# Patient Record
Sex: MALE | Race: WHITE | NOT HISPANIC OR LATINO | Employment: UNEMPLOYED | URBAN - NONMETROPOLITAN AREA
[De-identification: names, ages, dates, MRNs, and addresses within clinical notes are randomized per-mention and may not be internally consistent; named-entity substitution may affect disease eponyms.]

---

## 2017-11-15 ENCOUNTER — TRANSCRIBE ORDERS (OUTPATIENT)
Dept: LAB | Facility: CLINIC | Age: 47
End: 2017-11-15

## 2018-05-11 LAB
ALBUMIN SERPL BCP-MCNC: 4.5 G/DL (ref 3.5–5.7)
ALP SERPL-CCNC: 71 IU/L (ref 40–150)
ALT SERPL W P-5'-P-CCNC: 19 IU/L (ref 0–50)
AMMONIA PLAS-SCNC: 43 UMO/L (ref 25–90)
ANION GAP SERPL CALCULATED.3IONS-SCNC: 10.2 MM/L
AST SERPL W P-5'-P-CCNC: 18 U/L (ref 8–27)
BASOPHILS # BLD AUTO: 0 X3/UL (ref 0–0.3)
BASOPHILS # BLD AUTO: 0.4 % (ref 0–2)
BILIRUB SERPL-MCNC: 0.7 MG/DL (ref 0.3–1)
BUN SERPL-MCNC: 13 MG/DL (ref 7–25)
CALCIUM SERPL-MCNC: 9.4 MG/DL (ref 8.6–10.5)
CHLORIDE SERPL-SCNC: 106 MM/L (ref 98–107)
CO2 SERPL-SCNC: 30 MM/L (ref 21–31)
CREAT SERPL-MCNC: 1.03 MG/DL (ref 0.7–1.3)
DEPRECATED RDW RBC AUTO: 12.9 % (ref 11.5–14.5)
EGFR (HISTORICAL): > 60 GFR
EGFR AFRICAN AMERICAN (HISTORICAL): > 60 GFR
EOSINOPHIL # BLD AUTO: 0 X3/UL (ref 0–0.5)
EOSINOPHIL NFR BLD AUTO: 0.7 % (ref 0–5)
GLUCOSE (HISTORICAL): 95 MG/DL (ref 65–99)
HCT VFR BLD AUTO: 44.4 % (ref 42–52)
HGB BLD-MCNC: 15.2 G/DL (ref 14–18)
LYMPHOCYTES # BLD AUTO: 1.3 X3/UL (ref 1.2–4.2)
LYMPHOCYTES NFR BLD AUTO: 21.6 % (ref 20.5–51.1)
MCH RBC QN AUTO: 30.5 PG (ref 26–34)
MCHC RBC AUTO-ENTMCNC: 34.2 G/DL (ref 31–36)
MCV RBC AUTO: 88.9 FL (ref 81–99)
MONOCYTES # BLD AUTO: 0.4 X3/UL (ref 0–1)
MONOCYTES NFR BLD AUTO: 5.8 % (ref 1.7–12)
NEUTROPHILS # BLD AUTO: 4.4 X3/UL (ref 1.4–6.5)
NEUTS SEG NFR BLD AUTO: 71.5 % (ref 42.2–75.2)
OSMOLALITY, SERUM (HISTORICAL): 283 MOSM (ref 262–291)
PLATELET # BLD AUTO: 194 X3/UL (ref 130–400)
PMV BLD AUTO: 9 FL (ref 8.6–11.7)
POTASSIUM SERPL-SCNC: 4.2 MM/L (ref 3.5–5.5)
RBC # BLD AUTO: 4.99 X6/UL (ref 4.3–5.9)
SODIUM SERPL-SCNC: 142 MM/L (ref 134–143)
TOTAL PROTEIN (HISTORICAL): 7.1 G/DL (ref 6.4–8.9)
WBC # BLD AUTO: 6.1 X3/UL (ref 4.8–10.8)

## 2018-12-27 ENCOUNTER — HOSPITAL ENCOUNTER (EMERGENCY)
Facility: HOSPITAL | Age: 48
Discharge: ANOTHER HOSPITAL | End: 2018-12-27
Attending: EMERGENCY MEDICINE | Admitting: EMERGENCY MEDICINE
Payer: MEDICARE

## 2018-12-27 ENCOUNTER — APPOINTMENT (EMERGENCY)
Dept: CT IMAGING | Facility: HOSPITAL | Age: 48
End: 2018-12-27
Payer: MEDICARE

## 2018-12-27 VITALS
DIASTOLIC BLOOD PRESSURE: 72 MMHG | WEIGHT: 180 LBS | OXYGEN SATURATION: 92 % | BODY MASS INDEX: 30.73 KG/M2 | HEART RATE: 55 BPM | HEIGHT: 64 IN | SYSTOLIC BLOOD PRESSURE: 126 MMHG | RESPIRATION RATE: 17 BRPM | TEMPERATURE: 97.3 F

## 2018-12-27 DIAGNOSIS — R00.1 BRADYCARDIA: Primary | ICD-10-CM

## 2018-12-27 DIAGNOSIS — R56.9 SEIZURE (HCC): ICD-10-CM

## 2018-12-27 LAB
ALBUMIN SERPL BCP-MCNC: 4.4 G/DL (ref 3.5–5.7)
ALP SERPL-CCNC: 58 U/L (ref 40–150)
ALT SERPL W P-5'-P-CCNC: 13 U/L (ref 7–52)
ANION GAP SERPL CALCULATED.3IONS-SCNC: 12 MMOL/L (ref 4–13)
APTT PPP: 28 SECONDS (ref 26–38)
AST SERPL W P-5'-P-CCNC: 19 U/L (ref 13–39)
ATRIAL RATE: 35 BPM
BILIRUB SERPL-MCNC: 0.6 MG/DL (ref 0.2–1)
BUN SERPL-MCNC: 13 MG/DL (ref 7–25)
CALCIUM SERPL-MCNC: 9.4 MG/DL (ref 8.6–10.5)
CHLORIDE SERPL-SCNC: 102 MMOL/L (ref 98–107)
CO2 SERPL-SCNC: 25 MMOL/L (ref 21–31)
CREAT SERPL-MCNC: 1.09 MG/DL (ref 0.7–1.3)
ERYTHROCYTE [DISTWIDTH] IN BLOOD BY AUTOMATED COUNT: 12.9 % (ref 11.5–14.5)
GFR SERPL CREATININE-BSD FRML MDRD: 81 ML/MIN/1.73SQ M
GLUCOSE SERPL-MCNC: 179 MG/DL (ref 65–99)
HCT VFR BLD AUTO: 45.4 % (ref 36.5–49.3)
HGB BLD-MCNC: 15 G/DL (ref 14–18)
INR PPP: 1.09 (ref 0.9–1.5)
LYMPHOCYTES # BLD AUTO: 1.15 THOUSAND/UL (ref 0.6–4.47)
LYMPHOCYTES # BLD AUTO: 9 % (ref 20–51)
MAGNESIUM SERPL-MCNC: 1.9 MG/DL (ref 1.9–2.7)
MCH RBC QN AUTO: 30.2 PG (ref 26–34)
MCHC RBC AUTO-ENTMCNC: 33.1 G/DL (ref 31–37)
MCV RBC AUTO: 91 FL (ref 81–99)
MONOCYTES # BLD AUTO: 0.26 THOUSAND/UL (ref 0–1.22)
MONOCYTES NFR BLD AUTO: 2 % (ref 4–12)
NEUTS SEG # BLD: 11.39 THOUSAND/UL (ref 1.81–6.82)
NEUTS SEG NFR BLD AUTO: 89 % (ref 42–75)
NRBC BLD AUTO-RTO: 0 /100 WBCS
PLATELET # BLD AUTO: 207 THOUSANDS/UL (ref 149–390)
PLATELET BLD QL SMEAR: ADEQUATE
PMV BLD AUTO: 10.2 FL (ref 8.6–11.7)
POTASSIUM SERPL-SCNC: 3.4 MMOL/L (ref 3.5–5.5)
PR INTERVAL: 126 MS
PROT SERPL-MCNC: 7 G/DL (ref 6.4–8.9)
PROTHROMBIN TIME: 12.7 SECONDS (ref 10.2–13)
QRS AXIS: 133 DEGREES
QRSD INTERVAL: 82 MS
QT INTERVAL: 458 MS
QTC INTERVAL: 338 MS
RBC # BLD AUTO: 4.97 MILLION/UL (ref 4.3–5.9)
RBC MORPH BLD: NORMAL
SODIUM SERPL-SCNC: 139 MMOL/L (ref 134–143)
T WAVE AXIS: 153 DEGREES
TOTAL CELLS COUNTED SPEC: 100
TROPONIN I SERPL-MCNC: <0.03 NG/ML
TSH SERPL DL<=0.05 MIU/L-ACNC: 2.68 UIU/ML (ref 0.45–5.33)
VENTRICULAR RATE: 33 BPM
WBC # BLD AUTO: 12.8 THOUSAND/UL (ref 4.8–10.8)

## 2018-12-27 PROCEDURE — 83735 ASSAY OF MAGNESIUM: CPT | Performed by: EMERGENCY MEDICINE

## 2018-12-27 PROCEDURE — 70450 CT HEAD/BRAIN W/O DYE: CPT

## 2018-12-27 PROCEDURE — 96375 TX/PRO/DX INJ NEW DRUG ADDON: CPT

## 2018-12-27 PROCEDURE — 85610 PROTHROMBIN TIME: CPT | Performed by: EMERGENCY MEDICINE

## 2018-12-27 PROCEDURE — 93005 ELECTROCARDIOGRAM TRACING: CPT

## 2018-12-27 PROCEDURE — 85027 COMPLETE CBC AUTOMATED: CPT | Performed by: EMERGENCY MEDICINE

## 2018-12-27 PROCEDURE — 85007 BL SMEAR W/DIFF WBC COUNT: CPT | Performed by: EMERGENCY MEDICINE

## 2018-12-27 PROCEDURE — 84443 ASSAY THYROID STIM HORMONE: CPT | Performed by: EMERGENCY MEDICINE

## 2018-12-27 PROCEDURE — 85730 THROMBOPLASTIN TIME PARTIAL: CPT | Performed by: EMERGENCY MEDICINE

## 2018-12-27 PROCEDURE — 96361 HYDRATE IV INFUSION ADD-ON: CPT

## 2018-12-27 PROCEDURE — 96365 THER/PROPH/DIAG IV INF INIT: CPT

## 2018-12-27 PROCEDURE — 93010 ELECTROCARDIOGRAM REPORT: CPT | Performed by: INTERNAL MEDICINE

## 2018-12-27 PROCEDURE — 36415 COLL VENOUS BLD VENIPUNCTURE: CPT | Performed by: EMERGENCY MEDICINE

## 2018-12-27 PROCEDURE — 99285 EMERGENCY DEPT VISIT HI MDM: CPT

## 2018-12-27 PROCEDURE — 84484 ASSAY OF TROPONIN QUANT: CPT | Performed by: EMERGENCY MEDICINE

## 2018-12-27 PROCEDURE — 80053 COMPREHEN METABOLIC PANEL: CPT | Performed by: EMERGENCY MEDICINE

## 2018-12-27 RX ORDER — LORATADINE 10 MG/1
10 TABLET ORAL DAILY
COMMUNITY

## 2018-12-27 RX ORDER — LEVOCARNITINE 330 MG/1
330 TABLET ORAL 2 TIMES DAILY
COMMUNITY

## 2018-12-27 RX ORDER — LEVETIRACETAM 500 MG/1
500 TABLET ORAL EVERY 12 HOURS SCHEDULED
COMMUNITY
End: 2019-07-24 | Stop reason: DRUGHIGH

## 2018-12-27 RX ORDER — HYDROMORPHONE HCL/PF 1 MG/ML
0.5 SYRINGE (ML) INJECTION ONCE
Status: COMPLETED | OUTPATIENT
Start: 2018-12-27 | End: 2018-12-27

## 2018-12-27 RX ADMIN — SODIUM CHLORIDE 500 ML: 0.9 INJECTION, SOLUTION INTRAVENOUS at 15:30

## 2018-12-27 RX ADMIN — SODIUM CHLORIDE 1000 MG: 9 INJECTION, SOLUTION INTRAVENOUS at 14:13

## 2018-12-27 RX ADMIN — HYDROMORPHONE HYDROCHLORIDE 0.5 MG: 1 INJECTION, SOLUTION INTRAMUSCULAR; INTRAVENOUS; SUBCUTANEOUS at 15:30

## 2018-12-27 NOTE — ED NOTES
Report called to trauma neuro  [facility] [address], [address] [place] [address]  12/27/18 6482

## 2018-12-27 NOTE — EMTALA/ACUTE CARE TRANSFER
[address] [facility] [address]  [facility] [address] [address]  [phone]  Dept: [phone]      [address] TRANSFER CONSENT    NAME Evan Knott                                         1970                              MRN [id number]    I have been informed of my rights regarding examination, treatment, and transfer   by Dr Kade Drummond DO    Benefits: Specialized equipment and/or services available at the receiving facility (Include comment)________________________, Continuity of care    Risks: Potential for delay in receiving treatment, Loss of IV, Potential deterioration of medical condition      Consent for Transfer:  I acknowledge that my medical condition has been evaluated and explained to me by the emergency department physician or other qualified medical person and/or my attending physician, who has recommended that I be transferred to the service of  Accepting Physician: DR Sangita Escamilla at [address] [place] [address] Name, [facility] : [address]  The above potential benefits of such transfer, the potential risks associated with such transfer, and the probable risks of not being transferred have been explained to me, and I fully understand them  The doctor has explained that, in my case, the benefits of transfer outweigh the risks  I agree to be transferred  I authorize the performance of emergency medical procedures and treatments upon me in both transit and upon arrival at the receiving facility  Additionally, I authorize the release of any and all medical records to the receiving facility and request they be transported with me, if possible  I understand that the safest mode of transportation during a medical emergency is an ambulance and that the Hospital advocates the use of this mode of transport   Risks of traveling to the receiving facility by car, including absence of medical control, life sustaining equipment, such as oxygen, and medical personnel has been explained to me and I fully understand them  (DEB CORRECT BOX BELOW)  [  ]  I consent to the stated transfer and to be transported by ambulance/helicopter  [  ]  I consent to the stated transfer, but refuse transportation by ambulance and accept full responsibility for my transportation by car  I understand the risks of non-ambulance transfers and I exonerate the Hospital and its staff from any deterioration in my condition that results from this refusal     X___________________________________________    DATE  18  TIME________  Signature of patient or legally responsible individual signing on patient behalf           RELATIONSHIP TO PATIENT_________________________          Provider Certification    NAME Evan Knott                                         1970                              MRN [id number]    A medical screening exam was performed on the above named patient  Based on the examination:    Condition Necessitating Transfer The primary encounter diagnosis was Bradycardia  A diagnosis of Seizure [facility]) was also pertinent to this visit      Patient Condition: The patient has been stabilized such that within reasonable medical probability, no material deterioration of the patient condition or the condition of the unborn child(nohelia) is likely to result from the transfer    Reason for Transfer: Level of Care needed not available at this facility    Transfer Requirements: Facility [address] [place]   · Space available and qualified personnel available for treatment as acknowledged by    · Agreed to accept transfer and to provide appropriate medical treatment as acknowledged by       DR MAYORGA  · Appropriate medical records of the examination and treatment of the patient are provided at the time of transfer   [address] _______  · Transfer will be performed by qualified personnel from    and appropriate transfer equipment as required, including the use of necessary and appropriate life support measures  Provider Certification: I have examined the patient and explained the following risks and benefits of being transferred/refusing transfer to the patient/family:  General risk, such as traffic hazards, adverse weather conditions, rough terrain or turbulence, possible failure of equipment (including vehicle or aircraft), or consequences of actions of persons outside the control of the transport personnel, Risk of worsening condition      Based on these reasonable risks and benefits to the patient and/or the unborn child(nohelia), and based upon the information available at the time of the patients examination, I certify that the medical benefits reasonably to be expected from the provision of appropriate medical treatments at another medical facility outweigh the increasing risks, if any, to the individuals medical condition, and in the case of labor to the unborn child, from effecting the transfer      X____________________________________________ DATE 12/27/18        TIME_______      ORIGINAL - SEND TO MEDICAL RECORDS   COPY - SEND WITH PATIENT DURING TRANSFER

## 2018-12-27 NOTE — ED PROVIDER NOTES
History  Chief Complaint   Patient presents with    Seizure - Prior Hx Of     patient arrives today post seizure with bradycardia and headache    Headache     PT ARRIVES VIA [facility] [place]  OCCURRED IN BATHROOM , AFTER STANDING FROM COMMODE PER HIS DAD, WITH WHOM HE LIVES  DAD DESCRIBES AS A STARING THEN EYES ROLLED BACK IN HEAD THEN DAD NOTICED HIS FISTS WERE CLENCHED; NO REPORT OF DIFFUSE, GRAND MAL ACTIVITY OR BITING OF TONGUE  PT HIMSELF DOES NOT GIVE A THOROUGH HISTORY, OTHER THAN SAYING HE HAS A HA, FRONTAL  NO F/C  NO RECENT ILLNESS  DAD REPORTS LAST SEIZURE WAS MANY YEARS AGO  HE DENIUES CP OR DIZZINES OR SOB, BUT ADMITS TO FATIGUE  AGAIN, OVERALL NOT A FORTHCOMING HISTORIAN AS HE MAY BE "POSR-ICTAL" TO SOME DEGREE  Prior to Admission Medications   Prescriptions Last Dose Informant Patient Reported? Taking?   levETIRAcetam (KEPPRA) 500 mg tablet   Yes Yes   Sig: Take 500 mg by mouth every 12 (twelve) hours   levOCARNitine (CARNITOR) 330 MG tablet   Yes Yes   Sig: Take 330 mg by mouth 2 (two) times a day   loratadine (CLARITIN) 10 mg tablet   Yes Yes   Sig: Take 10 mg by mouth daily      Facility-Administered Medications: None       Past Medical History:   Diagnosis Date    Seizures ([address] )        Past Surgical History:   Procedure Laterality Date    CSF SHUNT         History reviewed  No pertinent family history  I have reviewed and agree with the history as documented  Social History   Substance Use Topics    Smoking status: Never Smoker    Smokeless tobacco: Never Used    Alcohol use No        Review of Systems   Unable to perform ROS: Mental status change       Physical Exam  Physical Exam   Constitutional: He is oriented to person, place, and time  He appears well-nourished  PATIENT IS AWAKE ALTHOUGH SOMEWHAT DROWSY  HE IS NOT LETHARGIC  BRADYCARDIA NOTED  GENERALLY WELL-NOURISHED WELL-DEVELOPED  HENT:   Head: Atraumatic     Right Ear: External ear normal  Left Ear: External ear normal    Mouth/Throat: Oropharynx is clear and moist    NO SIGNS OF INTRAORAL TRAUMA  Eyes: Pupils are equal, round, and reactive to light  Conjunctivae are normal  No scleral icterus  Neck: Normal range of motion  Neck supple  Cardiovascular: Regular rhythm and normal heart sounds  BRADYCARDIA   Pulmonary/Chest: Effort normal and breath sounds normal  No respiratory distress  Abdominal: Soft  Bowel sounds are normal  He exhibits no distension  There is no tenderness  Musculoskeletal: Normal range of motion  He exhibits no edema  Lymphadenopathy:     He has no cervical adenopathy  Neurological: He is oriented to person, place, and time  No cranial nerve deficit  He exhibits normal muscle tone  Coordination normal    TIRED BUT ANSWERS DIRECTED QUESTIONS GENERALLY APPROPRIATELY  Skin: Skin is warm and dry  Capillary refill takes less than 2 seconds  Vitals reviewed        Vital Signs  ED Triage Vitals [12/27/18 1123]   Temperature Pulse Respirations Blood Pressure SpO2   (!) 97 3 °F (36 3 °C) 94 16 124/61 94 %      Temp Source Heart Rate Source Patient Position - Orthostatic VS BP Location FiO2 (%)   Temporal Monitor Sitting Left arm --      Pain Score       9           Vitals:    12/27/18 1345 12/27/18 1530 12/27/18 1600 12/27/18 1615   BP: 141/86 125/60 126/72    Pulse: 68 63 58 55   Patient Position - Orthostatic VS: Lying          Visual Acuity  Visual Acuity      Most Recent Value   L Pupil Size (mm)  3   R Pupil Size (mm)  3          ED Medications  Medications   levETIRAcetam (KEPPRA) 1,000 mg in sodium chloride 0 9 % 100 mL IVPB (0 mg Intravenous Stopped 12/27/18 1430)   sodium chloride 0 9 % bolus 500 mL (0 mL Intravenous Stopped 12/27/18 1621)   HYDROmorphone (DILAUDID) injection 0 5 mg (0 5 mg Intravenous Given 12/27/18 1530)       Diagnostic Studies  Results Reviewed     Procedure Component Value Units Date/Time    TSH [[id number]]  (Normal) Collected:  12/27/18 Lab Status:  Final result Specimen:  Blood from Arm, Right Updated:  12/27/18 1514     TSH 3RD GENERATON 2 680 uIU/mL     Narrative:         Patients undergoing fluorescein dye angiography may retain small amounts of fluorescein in the body for 48-72 hours post procedure  Samples containing fluorescein can produce falsely depressed TSH values  If the patient had this procedure,a specimen should be resubmitted post fluorescein clearance  Troponin I [[id number]]  (Normal) Collected:  12/27/18    Lab Status:  Final result Specimen:  Blood from Arm, Right Updated:  12/27/18 1216     Troponin I <0 03 ng/mL     Comprehensive metabolic panel [[id number]]  (Abnormal) Collected:  12/27/18 1131    Lab Status:  Final result Specimen:  Blood from Arm, Right Updated:  12/27/18 1159     Sodium 139 mmol/L      Potassium 3 4 (L) mmol/L      Chloride 102 mmol/L      CO2 25 mmol/L      ANION GAP 12 mmol/L      BUN 13 mg/dL      Creatinine 1 09 mg/dL      Glucose 179 (H) mg/dL      Calcium 9 4 mg/dL      AST 19 U/L      ALT 13 U/L      Alkaline Phosphatase 58 U/L      Total Protein 7 0 g/dL      Albumin 4 4 g/dL      Total Bilirubin 0 60 mg/dL      eGFR 81 ml/min/1 73sq m     Narrative:         National Kidney Disease Education Program recommendations are as follows:  GFR calculation is accurate only with a steady state creatinine  Chronic Kidney disease less than 60 ml/min/1 73 sq  meters  Kidney failure less than 15 ml/min/1 73 sq  meters      Magnesium [[id number]]  (Normal) Collected:  12/27/18 1131    Lab Status:  Final result Specimen:  Blood from Arm, Right Updated:  12/27/18 1159     Magnesium 1 9 mg/dL     Protime-INR [[id number]]  (Normal) Collected:  12/27/18 1131    Lab Status:  Final result Specimen:  Blood from Arm, Right Updated:  12/27/18 1154     Protime 12 7 seconds      INR 1 09    APTT [[id number]]  (Normal) Collected:  12/27/18 1131    Lab Status:  Final result Specimen:  Blood from Arm, Right Updated:  12/27/18 1154     PTT 28 seconds     CBC and differential [[id number]]  (Abnormal) Collected:  12/27/18 1131    Lab Status:  Final result Specimen:  Blood from Arm, Left Updated:  12/27/18 1147     WBC 12 80 (H) Thousand/uL      RBC 4 97 Million/uL      Hemoglobin 15 0 g/dL      Hematocrit 45 4 %      MCV 91 fL      MCH 30 2 pg      MCHC 33 1 g/dL      RDW 12 9 %      MPV 10 2 fL      Platelets 632 Thousands/uL      nRBC 0 /100 WBCs                  CT head without contrast   Final Result by Judy Beach MD (12/27 1305)   1  The tip of the right parietal shunt is at the level of the right   lateral ventricle  There are no findings of intra or extra-axial   hemorrhage, midline shift or herniation  2   The MRI of the brain would be more sensitive examination and can be   considered for further evaluation with patient history of dizziness  3   The report of the critical findings were notified to Dr Janna Ortega,   emergency physician on 12/27/2018 at 1:06 PM                Signed by Judy Beach                 Procedures  Procedures       Phone Contacts  ED Phone Contact    ED Course  ED Course as of Jan 03 1924   Thu Dec 27, 2018   1353 EXAMINATION AND EVALUATION  BY REPORTS, THE PATIENT MAY HAVE HAD A VASOVAGAL EPISODE OR PERHAPS AN ABSENCE SEIZURE  IN THE ED HE HAD RECURRING BRADYCARDIA, ALTHOUGH ASYMPTOMATIC  HOWEVER, HE WAS NOT EXERTING HIMSELF, SIMPLY RESTING IN THE LITTER  THE BRADYCARDIA WAS AS LOW AS 33 ON 12 LEAD EKG  ON MONITOR IT DID DOES LEVEL WAS 29  THE RHYTHM WAS JUNCTIONAL  26 BECAUSE OF THE RECURRING BRADYCARDIA, AND THE UNAVAILABILITY OF ELECTROPHYSIOLOGY AT THIS FACILITY, THE PATIENT WILL BE TRANSFERRED TO A HIGHER LEVEL OF CARE  BECAUSE HE RECEIVE USE CARE FOR HIS SPINA BIFIDA AND CEREBRAL SHUNT  [facility], HE WILL BE TRANSFERRED TO [address] [place] [address]      [facility], [address] LEAVING THE FACILITY, THE PATIENT HAD A TONIC SEIZURE, WAS GIVEN SUPPORTIVE CARE AND A DOSE OF IV KEPPRA, 1 G  THE SEIZURE LASTED APPROXIMATELY 2-3 MINUTES  MDM  CritCare Time    Disposition  Final diagnoses:   Bradycardia   Seizure ([address] )     Time reflects when diagnosis was documented in both MDM as applicable and the Disposition within this note     Time User Action Codes Description Comment    12/27/2018  4:26 PM Tomasz Page [R00 1] Bradycardia     12/27/2018  4:26 PM John Xavier [R56 9] Seizure [facility])       ED Disposition     ED Disposition Condition Comment    Transfer to Another Facility  Safia Lorenzo should be transferred out to [address] [place] [address]         MD Documentation      Most Recent Value   Patient Condition  The patient has been stabilized such that within reasonable medical probability, no material deterioration of the patient condition or the condition of the unborn child(nohelia) is likely to result from the transfer   Reason for Transfer  Level of Care needed not available at this facility   Benefits of Transfer  Continuity of care, Specialized equipment and/or services available at the receiving facility (Include comment)________________________ [place] [address]   Risks of Transfer  Potential for delay in receiving treatment, Loss of IV, Potential deterioration of medical condition   Accepting Physician  [address] [place] [place] [address] Name, Erin Marroquin MD, DR [facility]   Provider Certification  General risk, such as traffic hazards, adverse weather conditions, rough terrain or turbulence, possible failure of equipment (including vehicle or aircraft), or consequences of actions of persons outside the control of the transport personnel, Risk of worsening condition      RN Documentation      Most Recent Value   [address] Name, Tonny RANDHAWA      Follow-up Information    None         Discharge Medication List as of 12/27/2018  4:59 PM      CONTINUE these medications which have NOT CHANGED    Details   levETIRAcetam (KEPPRA) 500 mg tablet Take 500 mg by mouth every 12 (twelve) hours, Historical Med      levOCARNitine (CARNITOR) 330 MG tablet Take 330 mg by mouth 2 (two) times a day, Historical Med      loratadine (CLARITIN) 10 mg tablet Take 10 mg by mouth daily, Historical Med           No discharge procedures on file      ED Provider  Electronically Signed by           Kayce Monroy DO  01/03/19 1928

## 2018-12-27 NOTE — ED NOTES
Bed obtained, [address] [place] [address] val[address]   time estimated lAexx Shin RN  12/27/18 2874

## 2018-12-27 NOTE — ED NOTES
Family came to desk, patient having unresponsive episode accompanied by vomiting  Patient's vitals currently stable  Nurse and physician at bedside  Patient placed on his side and suctioned        Zaida Avalos RN  12/27/18 9486

## 2019-04-26 ENCOUNTER — TRANSCRIBE ORDERS (OUTPATIENT)
Dept: ADMINISTRATIVE | Facility: HOSPITAL | Age: 49
End: 2019-04-26

## 2019-04-26 DIAGNOSIS — R00.2 PALPITATIONS: ICD-10-CM

## 2019-04-26 DIAGNOSIS — R00.0 TACHYCARDIA, UNSPECIFIED: ICD-10-CM

## 2019-04-26 DIAGNOSIS — R07.9 CHEST PAIN, UNSPECIFIED TYPE: Primary | ICD-10-CM

## 2019-05-04 ENCOUNTER — APPOINTMENT (EMERGENCY)
Dept: RADIOLOGY | Facility: HOSPITAL | Age: 49
End: 2019-05-04
Payer: MEDICARE

## 2019-05-04 ENCOUNTER — APPOINTMENT (EMERGENCY)
Dept: CT IMAGING | Facility: HOSPITAL | Age: 49
End: 2019-05-04
Payer: MEDICARE

## 2019-05-04 ENCOUNTER — HOSPITAL ENCOUNTER (EMERGENCY)
Facility: HOSPITAL | Age: 49
Discharge: HOME | End: 2019-05-04
Attending: EMERGENCY MEDICINE | Admitting: EMERGENCY MEDICINE
Payer: MEDICARE

## 2019-05-04 VITALS
TEMPERATURE: 98 F | OXYGEN SATURATION: 99 % | SYSTOLIC BLOOD PRESSURE: 107 MMHG | BODY MASS INDEX: 29.44 KG/M2 | HEIGHT: 62 IN | RESPIRATION RATE: 18 BRPM | DIASTOLIC BLOOD PRESSURE: 61 MMHG | HEART RATE: 73 BPM | WEIGHT: 160 LBS

## 2019-05-04 DIAGNOSIS — R10.9 LEFT SIDED ABDOMINAL PAIN OF UNKNOWN CAUSE: Primary | ICD-10-CM

## 2019-05-04 LAB
ALBUMIN SERPL BCP-MCNC: 3.6 G/DL (ref 3.5–5)
ALP SERPL-CCNC: 66 U/L (ref 46–116)
ALT SERPL W P-5'-P-CCNC: 28 U/L (ref 12–78)
ANION GAP SERPL CALCULATED.3IONS-SCNC: 3 MMOL/L (ref 4–13)
AST SERPL W P-5'-P-CCNC: 15 U/L (ref 5–45)
BASOPHILS # BLD AUTO: 0.02 THOUSANDS/ΜL (ref 0–0.1)
BASOPHILS NFR BLD AUTO: 0 % (ref 0–1)
BILIRUB SERPL-MCNC: 0.4 MG/DL (ref 0.2–1)
BILIRUB UR QL STRIP: NEGATIVE
BUN SERPL-MCNC: 18 MG/DL (ref 5–25)
CALCIUM SERPL-MCNC: 8.7 MG/DL (ref 8.3–10.1)
CHLORIDE SERPL-SCNC: 108 MMOL/L (ref 100–108)
CLARITY UR: CLEAR
CO2 SERPL-SCNC: 30 MMOL/L (ref 21–32)
COLOR UR: YELLOW
CREAT SERPL-MCNC: 0.91 MG/DL (ref 0.6–1.3)
EOSINOPHIL # BLD AUTO: 0.07 THOUSAND/ΜL (ref 0–0.61)
EOSINOPHIL NFR BLD AUTO: 1 % (ref 0–6)
ERYTHROCYTE [DISTWIDTH] IN BLOOD BY AUTOMATED COUNT: 11.5 % (ref 11.6–15.1)
GFR SERPL CREATININE-BSD FRML MDRD: 101 ML/MIN/1.73SQ M
GLUCOSE SERPL-MCNC: 84 MG/DL (ref 65–140)
GLUCOSE UR STRIP-MCNC: NEGATIVE MG/DL
HCT VFR BLD AUTO: 42.2 % (ref 36.5–49.3)
HGB BLD-MCNC: 13.9 G/DL (ref 12–17)
HGB UR QL STRIP.AUTO: NEGATIVE
IMM GRANULOCYTES # BLD AUTO: 0.02 THOUSAND/UL (ref 0–0.2)
IMM GRANULOCYTES NFR BLD AUTO: 0 % (ref 0–2)
KETONES UR STRIP-MCNC: NEGATIVE MG/DL
LEUKOCYTE ESTERASE UR QL STRIP: NEGATIVE
LIPASE SERPL-CCNC: 209 U/L (ref 73–393)
LYMPHOCYTES # BLD AUTO: 1.32 THOUSANDS/ΜL (ref 0.6–4.47)
LYMPHOCYTES NFR BLD AUTO: 27 % (ref 14–44)
MAGNESIUM SERPL-MCNC: 2.1 MG/DL (ref 1.6–2.6)
MCH RBC QN AUTO: 31.4 PG (ref 26.8–34.3)
MCHC RBC AUTO-ENTMCNC: 32.9 G/DL (ref 31.4–37.4)
MCV RBC AUTO: 95 FL (ref 82–98)
MONOCYTES # BLD AUTO: 0.42 THOUSAND/ΜL (ref 0.17–1.22)
MONOCYTES NFR BLD AUTO: 9 % (ref 4–12)
NEUTROPHILS # BLD AUTO: 3.11 THOUSANDS/ΜL (ref 1.85–7.62)
NEUTS SEG NFR BLD AUTO: 63 % (ref 43–75)
NITRITE UR QL STRIP: NEGATIVE
NRBC BLD AUTO-RTO: 0 /100 WBCS
PH UR STRIP.AUTO: 6 [PH]
PLATELET # BLD AUTO: 218 THOUSANDS/UL (ref 149–390)
PMV BLD AUTO: 10.3 FL (ref 8.9–12.7)
POTASSIUM SERPL-SCNC: 4.3 MMOL/L (ref 3.5–5.3)
PROT SERPL-MCNC: 6.7 G/DL (ref 6.4–8.2)
PROT UR STRIP-MCNC: NEGATIVE MG/DL
RBC # BLD AUTO: 4.43 MILLION/UL (ref 3.88–5.62)
SODIUM SERPL-SCNC: 141 MMOL/L (ref 136–145)
SP GR UR STRIP.AUTO: <=1.005 (ref 1–1.03)
UROBILINOGEN UR QL STRIP.AUTO: 0.2 E.U./DL
WBC # BLD AUTO: 4.96 THOUSAND/UL (ref 4.31–10.16)

## 2019-05-04 PROCEDURE — 85025 COMPLETE CBC W/AUTO DIFF WBC: CPT | Performed by: EMERGENCY MEDICINE

## 2019-05-04 PROCEDURE — 96360 HYDRATION IV INFUSION INIT: CPT

## 2019-05-04 PROCEDURE — 70250 X-RAY EXAM OF SKULL: CPT

## 2019-05-04 PROCEDURE — 80053 COMPREHEN METABOLIC PANEL: CPT | Performed by: EMERGENCY MEDICINE

## 2019-05-04 PROCEDURE — 74018 RADEX ABDOMEN 1 VIEW: CPT

## 2019-05-04 PROCEDURE — 99284 EMERGENCY DEPT VISIT MOD MDM: CPT | Performed by: EMERGENCY MEDICINE

## 2019-05-04 PROCEDURE — 99284 EMERGENCY DEPT VISIT MOD MDM: CPT

## 2019-05-04 PROCEDURE — 36415 COLL VENOUS BLD VENIPUNCTURE: CPT | Performed by: EMERGENCY MEDICINE

## 2019-05-04 PROCEDURE — 74177 CT ABD & PELVIS W/CONTRAST: CPT

## 2019-05-04 PROCEDURE — 71046 X-RAY EXAM CHEST 2 VIEWS: CPT

## 2019-05-04 PROCEDURE — 81003 URINALYSIS AUTO W/O SCOPE: CPT | Performed by: EMERGENCY MEDICINE

## 2019-05-04 PROCEDURE — 83735 ASSAY OF MAGNESIUM: CPT | Performed by: EMERGENCY MEDICINE

## 2019-05-04 PROCEDURE — 83690 ASSAY OF LIPASE: CPT | Performed by: EMERGENCY MEDICINE

## 2019-05-04 RX ADMIN — IOHEXOL 100 ML: 350 INJECTION, SOLUTION INTRAVENOUS at 14:47

## 2019-05-04 RX ADMIN — SODIUM CHLORIDE 1000 ML: 0.9 INJECTION, SOLUTION INTRAVENOUS at 13:51

## 2019-05-23 ENCOUNTER — HOSPITAL ENCOUNTER (OUTPATIENT)
Dept: NON INVASIVE DIAGNOSTICS | Facility: HOSPITAL | Age: 49
Discharge: HOME | End: 2019-05-23
Payer: MEDICARE

## 2019-05-23 DIAGNOSIS — R00.0 TACHYCARDIA, UNSPECIFIED: ICD-10-CM

## 2019-05-23 DIAGNOSIS — R07.9 CHEST PAIN, UNSPECIFIED TYPE: ICD-10-CM

## 2019-05-23 DIAGNOSIS — R00.2 PALPITATIONS: ICD-10-CM

## 2019-05-23 LAB
CHEST PAIN STATEMENT: NORMAL
MAX DIASTOLIC BP: 74 MMHG
MAX HEART RATE: 193 BPM
MAX PREDICTED HEART RATE: 174 BPM
MAX. SYSTOLIC BP: 126 MMHG
PROTOCOL NAME: NORMAL
REASON FOR TERMINATION: NORMAL
TARGET HR FORMULA: NORMAL
TIME IN EXERCISE PHASE: NORMAL

## 2019-05-23 PROCEDURE — 93225 XTRNL ECG REC<48 HRS REC: CPT

## 2019-05-23 PROCEDURE — 93226 XTRNL ECG REC<48 HR SCAN A/R: CPT

## 2019-05-24 PROCEDURE — 93018 CV STRESS TEST I&R ONLY: CPT | Performed by: INTERNAL MEDICINE

## 2019-05-24 PROCEDURE — 93016 CV STRESS TEST SUPVJ ONLY: CPT | Performed by: INTERNAL MEDICINE

## 2019-06-04 ENCOUNTER — APPOINTMENT (OUTPATIENT)
Dept: PHYSICAL THERAPY | Facility: MEDICAL CENTER | Age: 49
End: 2019-06-04
Payer: MEDICARE

## 2019-06-05 ENCOUNTER — EVALUATION (OUTPATIENT)
Dept: PHYSICAL THERAPY | Facility: MEDICAL CENTER | Age: 49
End: 2019-06-05
Payer: MEDICARE

## 2019-06-05 DIAGNOSIS — M77.8 TENDINITIS OF RIGHT SHOULDER: ICD-10-CM

## 2019-06-05 DIAGNOSIS — M77.8 TENDINITIS OF LEFT SHOULDER: Primary | ICD-10-CM

## 2019-06-05 PROCEDURE — 97163 PT EVAL HIGH COMPLEX 45 MIN: CPT

## 2019-06-05 PROCEDURE — 97110 THERAPEUTIC EXERCISES: CPT

## 2019-06-05 PROCEDURE — 97035 APP MDLTY 1+ULTRASOUND EA 15: CPT

## 2019-06-06 ENCOUNTER — APPOINTMENT (OUTPATIENT)
Dept: PHYSICAL THERAPY | Facility: MEDICAL CENTER | Age: 49
End: 2019-06-06
Payer: MEDICARE

## 2019-06-07 ENCOUNTER — OFFICE VISIT (OUTPATIENT)
Dept: PHYSICAL THERAPY | Facility: MEDICAL CENTER | Age: 49
End: 2019-06-07
Payer: MEDICARE

## 2019-06-07 DIAGNOSIS — M77.8 TENDINITIS OF RIGHT SHOULDER: ICD-10-CM

## 2019-06-07 DIAGNOSIS — M77.8 TENDINITIS OF LEFT SHOULDER: Primary | ICD-10-CM

## 2019-06-07 PROCEDURE — 97035 APP MDLTY 1+ULTRASOUND EA 15: CPT

## 2019-06-07 PROCEDURE — 97110 THERAPEUTIC EXERCISES: CPT

## 2019-06-10 ENCOUNTER — OFFICE VISIT (OUTPATIENT)
Dept: PHYSICAL THERAPY | Facility: MEDICAL CENTER | Age: 49
End: 2019-06-10
Payer: MEDICARE

## 2019-06-10 DIAGNOSIS — M77.8 TENDINITIS OF RIGHT SHOULDER: ICD-10-CM

## 2019-06-10 DIAGNOSIS — M77.8 TENDINITIS OF LEFT SHOULDER: Primary | ICD-10-CM

## 2019-06-10 PROCEDURE — 97110 THERAPEUTIC EXERCISES: CPT

## 2019-06-10 PROCEDURE — 97140 MANUAL THERAPY 1/> REGIONS: CPT

## 2019-06-12 ENCOUNTER — TRANSCRIBE ORDERS (OUTPATIENT)
Dept: PHYSICAL THERAPY | Facility: MEDICAL CENTER | Age: 49
End: 2019-06-12

## 2019-06-13 ENCOUNTER — OFFICE VISIT (OUTPATIENT)
Dept: PHYSICAL THERAPY | Facility: MEDICAL CENTER | Age: 49
End: 2019-06-13
Payer: MEDICARE

## 2019-06-13 DIAGNOSIS — M77.8 TENDINITIS OF RIGHT SHOULDER: ICD-10-CM

## 2019-06-13 DIAGNOSIS — M77.8 TENDINITIS OF LEFT SHOULDER: Primary | ICD-10-CM

## 2019-06-13 PROCEDURE — 97110 THERAPEUTIC EXERCISES: CPT

## 2019-06-14 ENCOUNTER — OFFICE VISIT (OUTPATIENT)
Dept: PHYSICAL THERAPY | Facility: MEDICAL CENTER | Age: 49
End: 2019-06-14
Payer: MEDICARE

## 2019-06-14 DIAGNOSIS — M77.8 TENDINITIS OF RIGHT SHOULDER: ICD-10-CM

## 2019-06-14 DIAGNOSIS — M77.8 TENDINITIS OF LEFT SHOULDER: Primary | ICD-10-CM

## 2019-06-14 PROCEDURE — 97035 APP MDLTY 1+ULTRASOUND EA 15: CPT

## 2019-06-14 PROCEDURE — 97110 THERAPEUTIC EXERCISES: CPT

## 2019-06-19 ENCOUNTER — OFFICE VISIT (OUTPATIENT)
Dept: PHYSICAL THERAPY | Facility: MEDICAL CENTER | Age: 49
End: 2019-06-19
Payer: MEDICARE

## 2019-06-19 DIAGNOSIS — M77.8 TENDINITIS OF LEFT SHOULDER: Primary | ICD-10-CM

## 2019-06-19 DIAGNOSIS — M77.8 TENDINITIS OF RIGHT SHOULDER: ICD-10-CM

## 2019-06-19 PROCEDURE — 97110 THERAPEUTIC EXERCISES: CPT

## 2019-06-21 ENCOUNTER — OFFICE VISIT (OUTPATIENT)
Dept: PHYSICAL THERAPY | Facility: MEDICAL CENTER | Age: 49
End: 2019-06-21
Payer: MEDICARE

## 2019-06-21 DIAGNOSIS — M77.8 TENDINITIS OF RIGHT SHOULDER: ICD-10-CM

## 2019-06-21 DIAGNOSIS — M77.8 TENDINITIS OF LEFT SHOULDER: Primary | ICD-10-CM

## 2019-06-21 PROCEDURE — 97140 MANUAL THERAPY 1/> REGIONS: CPT

## 2019-06-21 PROCEDURE — 97110 THERAPEUTIC EXERCISES: CPT

## 2019-06-24 ENCOUNTER — OFFICE VISIT (OUTPATIENT)
Dept: PHYSICAL THERAPY | Facility: MEDICAL CENTER | Age: 49
End: 2019-06-24
Payer: MEDICARE

## 2019-06-24 DIAGNOSIS — M77.8 TENDINITIS OF LEFT SHOULDER: Primary | ICD-10-CM

## 2019-06-24 DIAGNOSIS — M77.8 TENDINITIS OF RIGHT SHOULDER: ICD-10-CM

## 2019-06-24 PROCEDURE — 97140 MANUAL THERAPY 1/> REGIONS: CPT

## 2019-06-24 PROCEDURE — 97110 THERAPEUTIC EXERCISES: CPT

## 2019-06-27 ENCOUNTER — OFFICE VISIT (OUTPATIENT)
Dept: PHYSICAL THERAPY | Facility: MEDICAL CENTER | Age: 49
End: 2019-06-27
Payer: MEDICARE

## 2019-06-27 DIAGNOSIS — M77.8 TENDINITIS OF RIGHT SHOULDER: ICD-10-CM

## 2019-06-27 DIAGNOSIS — M77.8 TENDINITIS OF LEFT SHOULDER: Primary | ICD-10-CM

## 2019-06-27 PROCEDURE — 97140 MANUAL THERAPY 1/> REGIONS: CPT

## 2019-06-27 PROCEDURE — 97110 THERAPEUTIC EXERCISES: CPT

## 2019-07-01 ENCOUNTER — OFFICE VISIT (OUTPATIENT)
Dept: PHYSICAL THERAPY | Facility: MEDICAL CENTER | Age: 49
End: 2019-07-01
Payer: MEDICARE

## 2019-07-01 DIAGNOSIS — M77.8 TENDINITIS OF LEFT SHOULDER: Primary | ICD-10-CM

## 2019-07-01 DIAGNOSIS — M77.8 TENDINITIS OF RIGHT SHOULDER: ICD-10-CM

## 2019-07-01 PROCEDURE — 97110 THERAPEUTIC EXERCISES: CPT

## 2019-07-01 NOTE — PROGRESS NOTES
Daily Note      Today's date: 2019  Patient name: Ronnell Ponce  : 1970  MRN: [id number]  Referring provider: Natalie Trinidad DO  Dx:   Encounter Diagnosis     ICD-10-CM    1  Tendinitis of left shoulder M75 82    2  Tendinitis of right shoulder M75 81                   Subjective: Notes He can now move shoulders back (ROM) more with therapy tx's      Objective: See treatment diary below  Reassessment 2019    Exercise Diary     Shrugs/ retractions x30 X 30 x30 x30 X 30   thera band pulls Green x30 [employer] x 30 Friendly x30 [employer] x30 Friendly x 30   Supine cane x20 X 20 x20 x20 x20   cervical rotation ROM x5 ea X 5 ea x5 ea x5 ea X 5 ea   Post cap stretch 5 x10" 5 x10 5 x10" 5 x10" ea  5 x 10" es   UBC  F/B 120/30  x3' 120/30 x 4 min 120 /30  x5 min 120/30  x6' 120/30 x 7 min   pulleys x30 X 30 x30 x30 X 30   Doorway stretches 5 x10" 5 x 10 sec 5 x10 sec 5 x10" 5 x 10"   IR/ER  towel 4 x10" 4 x 10 sec 4x10 sec 4x10" 5 x 10"   B ext stretch with cane 4 x10" 4 x10 " 4 x10 sec 4 x10" 5 x 10"   Forward lean stretch np done 4 x10 sec 4 x10 sec 5 x 10"   theraband ext rot hold  Green 1 min Green x1' Green x1' Green x 1"   AAROM L flex 156 deg  160 deg 166 deg                    Manual         PROM Both shoulders Done L Done l Done L done done           Modalities            US  L shld x10 min prn prn prn prn   HP  B shld x10 min X 10 min x10 min  X 10 min       Assessment: Tolerated treatment well  Patient would benefit from continued PT and will continue witih some exercises/stretches on HEP      Plan: Continue per plan of care  Progress treatment as tolerated

## 2019-07-03 ENCOUNTER — EVALUATION (OUTPATIENT)
Dept: PHYSICAL THERAPY | Facility: MEDICAL CENTER | Age: 49
End: 2019-07-03
Payer: MEDICARE

## 2019-07-03 DIAGNOSIS — M77.8 TENDINITIS OF RIGHT SHOULDER: ICD-10-CM

## 2019-07-03 DIAGNOSIS — M77.8 TENDINITIS OF LEFT SHOULDER: Primary | ICD-10-CM

## 2019-07-03 PROCEDURE — 97140 MANUAL THERAPY 1/> REGIONS: CPT

## 2019-07-03 PROCEDURE — 97110 THERAPEUTIC EXERCISES: CPT

## 2019-07-03 NOTE — PROGRESS NOTES
Daily Note     Today's date: 7/3/2019  Patient name: Solitario Bonilla  : 1970  MRN: [id number]  Referring provider: Mimi Ken DO  Dx:   Encounter Diagnosis     ICD-10-CM    1  Tendinitis of left shoulder M75 82    2  Tendinitis of right shoulder M75 81        Assessment  Assessment details: Pt is 57 y o  Male referred to physical therapy by Dr Noy Gratn with diagnosis of bilateral shoulder tendonitis, bursitis  Patient seen this date for 30 day reassessment  Comorbidities affecting pt's physical performance at time of assessment include: shunt, seizures, spina bifida, scoliosis, back pain, asthma  Anastasia Noel has improved significantly with physical therapy  He has gained significant ROM and function with much less pain  His functional score as measured by FOTO is beyond expectation Please find objective findings from PT reassessment outlined below, with impairments and limitations including severe shoulder pain, decreased shoulder ROM, decreased shoulder strength   Pt to benefit from short course of continued PT tx to address deficits as defined above and maximize level of functional independent mobility in order to facilitate return to prior level of function and determine effectiveness of home exercise program   Impairments: abnormal or restricted ROM, activity intolerance, impaired balance, impaired physical strength, lacks appropriate home exercise program, pain with function and poor posture     Symptom irritability: moderateBarriers to therapy: Cognition, chronic nature of pain  Understanding of Dx/Px/POC: good   Prognosis: good    Goals  Short Term Goals to be achieved in 3-4 weeks  Patient will demonstrate full cervical ROM without pain in right upper trapezius area met  Pain level will decrease to 3-4/10 with functional activity met  Patient will be able to reach shoulder height shelf without pain Met  Patient will be able to apply Deoderant without difficulty met  Functional score as measured by [facility] will improve to 52% met  Patient will be able to pull up blankets without pain met  Long Term Goals to be achieved in 4-8 weeks  Patient will be independent in home exercise program progressing  Patient pain level will decrease to 1-2/10 [facility] functional activities  Patient ROM will increase to full active and passive shoulder ROM without pain  Patient will be able to resume household chores without pain  Patrient will increase score on FOTO to 60% met    Plan  Plan details: Treatments may be added or discharged at the discretion of the physical therapist     Planned modality interventions: thermotherapy: hydrocollator packs, cryotherapy and ultrasound  Planned therapy interventions: manual therapy, patient education, strengthening, stretching, therapeutic exercise, functional ROM exercises and home exercise program  Frequency: 1-2 times/ week  Duration in visits: 2-4  Duration in weeks: 2-4  Plan of Care beginning date: 7/3/2019  Plan of Care expiration date: 2019  Treatment plan discussed with: family, patient and PTA        Subjective Evaluation    History of Present Illness  Date of onset: 2018  Mechanism of injury: Pain began with unknown onset  Was hospitalized for 61 days due to surgery  Pain present when in hospital          Not a recurrent problem   Quality of life: good    Pain  Current pain ratin  At best pain ratin  At worst pain ratin-3  Location: both shouldes and down arms  Progression: improved    Hand dominance: left      Patient Goals  Patient goals for therapy: decreased pain, increased motion, increased strength, independence with ADLs/IADLs and return to sport/leisure activities  Patient goal: to do more activity without pain        Palpation   Left   Muscle spasm in the middle trapezius  resolved   Tenderness of the biceps and supraspinatus   resolved    Cervical/Thoracic Screen   Cervical range of motion within normal limits with the following exceptions: Cervical rotation left causes pain right upper trapezius now WNL without pain    Active Range of Motion   Left Shoulder   Flexion: 120 degrees now 165  Extension: WFL  Abduction: WFL  External rotation 90°: 45 degrees now 65  Horizontal adduction: with pain now painfree    Right Shoulder   Flexion: WFL  Extension: 35 degrees  Now WFL  Abduction: WFL  External rotation 90°: WFL  Horizontal adduction: WFL    Strength/Myotome Testing     Left Shoulder     Planes of Motion   Flexion: 3+ Now 4-/5  Extension: 4+   Abduction: 4-   Adduction: 4+   External rotation at 0°: 4-   Internal rotation at 0°: 4+     Right Shoulder     Planes of Motion   Flexion: 4   Extension: 4+   Abduction: 4   Adduction: 4+   External rotation at 0°: 4   Internal rotation at 0°: 4+                    Subjective: Doing very well pleased with progress      Objective: See treatment diary below      Assessment: Tolerated treatment well and begin planning discharge to home exercise program  Patient exhibited good technique with therapeutic exercises and Will see for 1-2 more visits to evaluate effectiveness of home exercise program and results of pain and function with decreasing therapeutic intervention      Plan: Patient reassessed    Plan of care and updated goals sent to MD   Plan to see patient for 1-2 more visits then discharge to home exercise program if progress continues

## 2019-07-03 NOTE — LETTER
2019    [address]  [address]  [facility] [address]    Patient: Dilcia Leach   YOB: 1970   Date of Visit: 7/3/2019     Encounter Diagnosis     ICD-10-CM    1  Tendinitis of left shoulder M75 82    2  Tendinitis of right shoulder M75 81        Dear Dr Ander Awan:    Please review the attached Plan of Care from Kristi Gillis recent visit  Please verify that you agree therapy should continue by signing the attached document and sending it back to our office  If you have any questions or concerns, please don't hesitate to call  Sincerely,    Ozzie Romero PT      Referring Provider:      I certify that I have read the below Plan of Care and certify the need for these services furnished under this plan of treatment while under my care  Merrill Hubbard DO  [address] [place] [address]  [address]  [place] [address]  VIA Facsimile: [phone]          Daily Note     Today's date: 7/3/2019  Patient name: Dilcia Leach  : 1970  MRN: [id number]  Referring provider: Carter Talbert DO  Dx:   Encounter Diagnosis     ICD-10-CM    1  Tendinitis of left shoulder M75 82    2  Tendinitis of right shoulder M75 81        Assessment  Assessment details: Pt is 57 y o  Male referred to physical therapy by Dr Ander Awan with diagnosis of bilateral shoulder tendonitis, bursitis  Patient seen this date for 30 day reassessment  Comorbidities affecting pt's physical performance at time of assessment include: shunt, seizures, spina bifida, scoliosis, back pain, asthma  Tamiemeka Abdi has improved significantly with physical therapy  He has gained significant ROM and function with much less pain  His functional score as measured by FOTO is beyond expectation Please find objective findings from PT reassessment outlined below, with impairments and limitations including severe shoulder pain, decreased shoulder ROM, decreased shoulder strength   Pt to benefit from short course of continued PT tx to address deficits as defined above and maximize level of functional independent mobility in order to facilitate return to prior level of function and determine effectiveness of home exercise program   Impairments: abnormal or restricted ROM, activity intolerance, impaired balance, impaired physical strength, lacks appropriate home exercise program, pain with function and poor posture     Symptom irritability: moderateBarriers to therapy: Cognition, chronic nature of pain  Understanding of Dx/Px/POC: good   Prognosis: good    Goals  Short Term Goals to be achieved in 3-4 weeks  Patient will demonstrate full cervical ROM without pain in right upper trapezius area met  Pain level will decrease to 3-4/10 with functional activity met  Patient will be able to reach shoulder height shelf without pain Met  Patient will be able to apply Deoderant without difficulty met  Functional score as measured by FOTO will improve to 52% met  Patient will be able to pull up blankets without pain met  Long Term Goals to be achieved in 4-8 weeks  Patient will be independent in home exercise program progressing  Patient pain level will decrease to 1-2/10 wi functional activities  Patient ROM will increase to full active and passive shoulder ROM without pain  Patient will be able to resume household chores without pain  Patrient will increase score on FOTO to 60% met    Plan  Plan details: Treatments may be added or discharged at the discretion of the physical therapist     Planned modality interventions: thermotherapy: hydrocollator packs, cryotherapy and ultrasound  Planned therapy interventions: manual therapy, patient education, strengthening, stretching, therapeutic exercise, functional ROM exercises and home exercise program  Frequency: 1-2 times/ week  Duration in visits: 2-4  Duration in weeks: 2-4  Plan of Care beginning date: 7/3/2019  Plan of Care expiration date: 7/31/2019  Treatment plan discussed with: family, patient and PTA        Subjective Evaluation    History of Present Illness  Date of onset: 2018  Mechanism of injury: Pain began with unknown onset  Was hospitalized for 61 days due to surgery  Pain present when in hospital          Not a recurrent problem   Quality of life: good    Pain  Current pain ratin  At best pain ratin  At worst pain ratin-3  Location: both shouldes and down arms  Progression: improved    Hand dominance: left      Patient Goals  Patient goals for therapy: decreased pain, increased motion, increased strength, independence with ADLs/IADLs and return to sport/leisure activities  Patient goal: to do more activity without pain        Palpation   Left   Muscle spasm in the middle trapezius  resolved   Tenderness of the biceps and supraspinatus   resolved    Cervical/Thoracic Screen   Cervical range of motion within normal limits with the following exceptions: Cervical rotation left causes pain right upper trapezius now WNL without pain    Active Range of Motion   Left Shoulder   Flexion: 120 degrees now 165  Extension: WFL  Abduction: WFL  External rotation 90°: 45 degrees now 65  Horizontal adduction: with pain now painfree    Right Shoulder   Flexion: WFL  Extension: 35 degrees  Now WFL  Abduction: WFL  External rotation 90°: WFL  Horizontal adduction: WFL    Strength/Myotome Testing     Left Shoulder     Planes of Motion   Flexion: 3+ Now 4-/5  Extension: 4+   Abduction: 4-   Adduction: 4+   External rotation at 0°: 4-   Internal rotation at 0°: 4+     Right Shoulder     Planes of Motion   Flexion: 4   Extension: 4+   Abduction: 4   Adduction: 4+   External rotation at 0°: 4   Internal rotation at 0°: 4+                    Subjective: Doing very well pleased with progress      Objective: See treatment diary below      Assessment: Tolerated treatment well and begin planning discharge to home exercise program  Patient exhibited good technique with therapeutic exercises and Will see for 1-2 more visits to evaluate effectiveness of home exercise program and results of pain and function with decreasing therapeutic intervention      Plan: Patient reassessed    Plan of care and updated goals sent to MD   Plan to see patient for 1-2 more visits then discharge to home exercise program if progress continues

## 2019-07-09 ENCOUNTER — TRANSCRIBE ORDERS (OUTPATIENT)
Dept: PHYSICAL THERAPY | Facility: MEDICAL CENTER | Age: 49
End: 2019-07-09

## 2019-07-09 DIAGNOSIS — M77.8 TENDINITIS OF LEFT SHOULDER: Primary | ICD-10-CM

## 2019-07-10 ENCOUNTER — OFFICE VISIT (OUTPATIENT)
Dept: PHYSICAL THERAPY | Facility: MEDICAL CENTER | Age: 49
End: 2019-07-10
Payer: MEDICARE

## 2019-07-10 DIAGNOSIS — M77.8 TENDINITIS OF LEFT SHOULDER: Primary | ICD-10-CM

## 2019-07-10 DIAGNOSIS — M77.8 TENDINITIS OF RIGHT SHOULDER: ICD-10-CM

## 2019-07-10 PROCEDURE — 97035 APP MDLTY 1+ULTRASOUND EA 15: CPT

## 2019-07-10 PROCEDURE — 97110 THERAPEUTIC EXERCISES: CPT

## 2019-07-10 NOTE — PROGRESS NOTES
Daily Note     Today's date: 7/10/2019  Patient name: Jose Townsend  : 1970  MRN: [id number]  Referring provider: Chary Torres DO  Dx:   Encounter Diagnosis     ICD-10-CM    1  Tendinitis of left shoulder M75 82    2  Tendinitis of right shoulder M75 81                   Subjective:       Objective: See treatment diary below    Exercise Diary  6/21 6/24 6/27 7/1 7/10/2019   Shrugs/ retractions X 30 x30 x30 X 30 #1 x 30   thera band pulls [employer] x 30 [employer] x30 [employer] x30 [employer] x 30 [place] x30   Supine cane X 20 x20 x20 x20 X 25   cervical rotation ROM X 5 ea x5 ea x5 ea X 5 ea X 5 ea   Post cap stretch 5 x10 5 x10" 5 x10" ea  5 x 10" es 5 x 10 sec   UBC  F/B 120/30 x 4 min 120 /30  x5 min 120/30  x6' 120/30 x 7 min 120/30 8 min   pulleys X 30 x30 x30 X 30 X 30   Doorway stretches 5 x 10 sec 5 x10 sec 5 x10" 5 x 10" 5 x 10 sec   IR/ER  towel 4 x 10 sec 4x10 sec 4x10" 5 x 10" 5 x 10   B ext stretch with cane 4 x10 " 4 x10 sec 4 x10" 5 x 10" 5 x 10 sec   Forward lean stretch done 4 x10 sec 4 x10 sec 5 x 10" 5 x 10 sec   theraband ext rot hold Green 1 min Green x1' Green x1' Green x 1" Green x 1 min   AAROM L flex  160 deg 166 deg                     Manual         PROM Both shoulders Done l Done L done done done           Modalities            US  L shld prn prn prn prn X 10   HP  B shld X 10 min x10 min  X 10 min X 10       Assessment: Tolerated treatment well and no decline without intervention, full passive ROM  Patient exhibited good technique with therapeutic exercises and plan D/c after next visit if no regression      Plan: Continue per plan of care  Progress treatment as tolerated     plan d/c after next visit if no regression

## 2019-07-17 ENCOUNTER — OFFICE VISIT (OUTPATIENT)
Dept: PHYSICAL THERAPY | Facility: MEDICAL CENTER | Age: 49
End: 2019-07-17
Payer: MEDICARE

## 2019-07-17 DIAGNOSIS — M77.8 TENDINITIS OF RIGHT SHOULDER: ICD-10-CM

## 2019-07-17 DIAGNOSIS — M77.8 TENDINITIS OF LEFT SHOULDER: Primary | ICD-10-CM

## 2019-07-17 PROCEDURE — 97110 THERAPEUTIC EXERCISES: CPT

## 2019-07-17 NOTE — PROGRESS NOTES
Daily Note     Today's date: 2019  Patient name: Myah Oconnor  : 1970  MRN: [id number]  Referring provider: Apolinar Mckee DO  Dx:   Encounter Diagnosis     ICD-10-CM    1  Tendinitis of left shoulder M75 82    2  Tendinitis of right shoulder M75 81                   Subjective: Pt reports shoulders feel good  Objective: See treatment diary below      Assessment: Tolerated treatment well  Patient exhibited good technique with therapeutic exercises  Plan: Pt D/c'd from PT today as per plan         Exercise Diary  6/24 6/27 7/1 7/10/2019 7/17   Shrugs/ retractions x30 x30 X 30 #1 x 30 1# x30   thera band pulls [employer] x30 [employer] x30 [employer] x 30 [employer] x30 [employer] x30   Supine cane x20 x20 x20 X 25 x30   cervical rotation ROM x5 ea x5 ea X 5 ea X 5 ea x5ea   Post cap stretch 5 x10" 5 x10" ea  5 x 10" es 5 x 10 sec 5 x10 sec   UBC  F/B 120 /30  x5 min 120/30  x6' 120/30 x 7 min 120/30 x8 min 120/30 x8 min   pulleys x30 x30 X 30 X 30 x30   Doorway stretches 5 x10 sec 5 x10" 5 x 10" 5 x 10 sec 5x10"   IR/ER  towel 4x10 sec 4x10" 5 x 10" 5 x 10 5x10"   B ext stretch with cane 4 x10 sec 4 x10" 5 x 10" 5 x 10 sec 5 x10 sec   Forward lean stretch 4 x10 sec 4 x10 sec 5 x 10" 5 x 10 sec 5 x10"   theraband ext rot hold Green x1' Green x1' Green x 1" Green x 1 min Green x1 min   AAROM L flex 160 deg 166 deg   175 deg                   Manual         PROM Both shoulders Done L done done done done           Modalities            US  L shld prn prn prn X 10 ----   HP  B shld x10 min  X 10 min X 10 x10 min diffuse

## 2019-07-24 ENCOUNTER — OFFICE VISIT (OUTPATIENT)
Dept: FAMILY MEDICINE CLINIC | Facility: CLINIC | Age: 49
End: 2019-07-24
Payer: MEDICARE

## 2019-07-24 VITALS
HEIGHT: 61 IN | OXYGEN SATURATION: 97 % | DIASTOLIC BLOOD PRESSURE: 68 MMHG | HEART RATE: 88 BPM | SYSTOLIC BLOOD PRESSURE: 110 MMHG | WEIGHT: 161.6 LBS | BODY MASS INDEX: 30.51 KG/M2

## 2019-07-24 DIAGNOSIS — L23.9 ALLERGIC CONTACT DERMATITIS, UNSPECIFIED TRIGGER: Primary | ICD-10-CM

## 2019-07-24 PROCEDURE — 99212 OFFICE O/P EST SF 10 MIN: CPT | Performed by: FAMILY MEDICINE

## 2019-07-24 RX ORDER — ACETAMINOPHEN 500 MG
500 TABLET ORAL EVERY 4 HOURS PRN
COMMUNITY
Start: 2019-01-17

## 2019-07-24 RX ORDER — LEVETIRACETAM 750 MG/1
1 TABLET ORAL 2 TIMES DAILY
Refills: 5 | COMMUNITY
Start: 2019-07-16 | End: 2020-03-19 | Stop reason: DRUGHIGH

## 2019-07-24 NOTE — PATIENT INSTRUCTIONS
Contact Dermatitis   AMBULATORY CARE:   Contact dermatitis  is a rash  It develops when you touch something that irritates your skin or causes an allergic reaction  Common signs and symptoms include the following:   · Red, swollen, painful rash           · Skin that itches, stings, or burns    · Dry, scaly, or crusty skin patches    · Bumps or blisters    · Fluid draining from blisters  Call 911 for any of the following:   · You have sudden trouble breathing  · Your throat swells and you have trouble eating  · Your face is swollen  Contact your healthcare provider if:   · You have a fever  · Your blisters are draining pus  · Your rash spreads or does not get better, even after treatment  · You have questions or concerns about your condition or care  Treatment for contact dermatitis  involves removing any irritants or allergens that cause your rash  You may also need medicines to decrease itching and swelling  They will be given as a topical medicine to apply to your rash or as a pill  Manage contact dermatitis:   · Take short baths or showers in cool water  Use mild soap or soap-free cleansers  Add oatmeal, baking soda, or cornstarch to the bath water to help decrease skin irritation  · Avoid skin irritants , such as makeup, hair products, soaps, and cleansers  Use products that do not contain perfume or dye  · Apply a cool compress to your rash  This will help soothe your skin  · Keep your skin moist   Rub unscented cream or lotion on your skin to prevent dryness and itching  Do this right after a bath or shower when your skin is still damp  Follow up with your healthcare provider as directed:  Write down your questions so you remember to ask them during your visits  © 2017 [employer] Information is for End User's use only and may not be sold, redistributed or otherwise used for commercial purposes   All illustrations and images included in CareNotes® are the copyrighted property of [employer]  or [employer]twan [employer]enot  The above information is an  only  It is not intended as medical advice for individual conditions or treatments  Talk to your doctor, nurse or pharmacist before following any medical regimen to see if it is safe and effective for you

## 2019-07-24 NOTE — PROGRESS NOTES
Assessment/Plan:    Allergic contact dermatitis, unspecified cause  I believe this is a contact dermatitis  I recommended we treat him with a mild steroid cream and warm compresses  I recommended desonide 0 05% cream to be applied sparingly to the left eyelid twice a day  Patient's father has this at home  He develops the same condition  I told him to go ahead and use the desonide that they have at home  Warm compresses should be used 3 times per day for 15 minutes at a time until improved  When he does improve, the desonide cream should be discontinued  Return to office if no improvement or worsens  Diagnoses and all orders for this visit:    Allergic contact dermatitis, unspecified trigger    Other orders  -     acetaminophen (TYLENOL) 500 mg tablet; Take 500 mg by mouth every 4 (four) hours as needed  -     levETIRAcetam (KEPPRA) 750 mg tablet; Take 1 tablet by mouth 2 (two) times a day          Subjective:      Patient ID: Katarina Alegria is a 54 y o  male  Patient is a 82-year-old white male presents to the office today stating that he woke up this morning with his left eyelid being swollen and red  He denies any discharge from his eyes  He denies any pain or itching  He denies any blurred vision  The following portions of the patient's history were reviewed and updated as appropriate: allergies, current medications, past family history, past medical history, past social history, past surgical history and problem list     Review of Systems      Objective:      /68 (BP Location: Left arm, Patient Position: Sitting, Cuff Size: Adult)   Pulse 88   Ht 5' 1" (1 549 m)   Wt 73 3 kg (161 lb 9 6 oz)   SpO2 97%   BMI 30 53 kg/m²          Physical Exam   Constitutional:   Patient is a 82-year-old white male who appears his stated age and is in no distress  HENT:   Head: Normocephalic and atraumatic     Right Ear: External ear normal    Left Ear: External ear normal    Mouth/Throat: Oropharynx is clear and moist  No oropharyngeal exudate  Eyes: Pupils are equal, round, and reactive to light  Conjunctivae are normal  Right eye exhibits no discharge  Left eye exhibits no discharge  No scleral icterus  The left eyelid is red and swollen  I was unable to leanne the patient's eyelid as the patient was unable to hold still for this part of the exam   However, does not appear to be a chalazion or hordeoum  There was no scaling of the skin of the eyelid  Neck: Neck supple  No tracheal deviation present  No thyromegaly present  Cardiovascular: Normal rate, regular rhythm and normal heart sounds  Exam reveals no gallop and no friction rub  No murmur heard  Pulmonary/Chest: Effort normal and breath sounds normal  No stridor  No respiratory distress  He has no wheezes  Lymphadenopathy:     He has no cervical adenopathy  Vitals reviewed

## 2019-10-04 ENCOUNTER — CLINICAL SUPPORT (OUTPATIENT)
Dept: FAMILY MEDICINE CLINIC | Facility: CLINIC | Age: 49
End: 2019-10-04
Payer: MEDICARE

## 2019-10-04 DIAGNOSIS — Z23 IMMUNIZATION DUE: Primary | ICD-10-CM

## 2019-10-04 PROCEDURE — 90682 RIV4 VACC RECOMBINANT DNA IM: CPT | Performed by: FAMILY MEDICINE

## 2019-10-04 PROCEDURE — G0008 ADMIN INFLUENZA VIRUS VAC: HCPCS | Performed by: FAMILY MEDICINE

## 2020-03-09 ENCOUNTER — OFFICE VISIT (OUTPATIENT)
Dept: FAMILY MEDICINE CLINIC | Facility: CLINIC | Age: 50
End: 2020-03-09
Payer: MEDICARE

## 2020-03-09 VITALS
BODY MASS INDEX: 30.11 KG/M2 | DIASTOLIC BLOOD PRESSURE: 74 MMHG | WEIGHT: 163.6 LBS | TEMPERATURE: 98.9 F | HEIGHT: 62 IN | HEART RATE: 92 BPM | SYSTOLIC BLOOD PRESSURE: 110 MMHG | OXYGEN SATURATION: 97 %

## 2020-03-09 DIAGNOSIS — J00 ACUTE NASOPHARYNGITIS (COMMON COLD): Primary | ICD-10-CM

## 2020-03-09 PROCEDURE — 3008F BODY MASS INDEX DOCD: CPT | Performed by: FAMILY MEDICINE

## 2020-03-09 PROCEDURE — 99212 OFFICE O/P EST SF 10 MIN: CPT | Performed by: FAMILY MEDICINE

## 2020-03-09 PROCEDURE — 1036F TOBACCO NON-USER: CPT | Performed by: FAMILY MEDICINE

## 2020-03-09 NOTE — ASSESSMENT & PLAN NOTE
Patient has a viral upper respiratory tract infection  I see no evidence of sinusitis  There is no role and antibiotics in treating this  I explained this to the patient and his father  Treatment should be symptomatic  I recommended continuation of Robitussin DM  I recommended they increase fluid intake and rest   He may take Tylenol as needed  He was given a note for work  Return to office if no improvement or worsens

## 2020-03-09 NOTE — PROGRESS NOTES
Assessment/Plan:    Acute nasopharyngitis (common cold)  Patient has a viral upper respiratory tract infection  I see no evidence of sinusitis  There is no role and antibiotics in treating this  I explained this to the patient and his father  Treatment should be symptomatic  I recommended continuation of Robitussin DM  I recommended they increase fluid intake and rest   He may take Tylenol as needed  He was given a note for work  Return to office if no improvement or worsens  Diagnoses and all orders for this visit:    Acute nasopharyngitis (common cold)        BMI Counseling: There is no height or weight on file to calculate BMI  The BMI is above normal  Nutrition recommendations include decreasing portion sizes, encouraging healthy choices of fruits and vegetables, decreasing fast food intake, consuming healthier snacks, limiting drinks that contain sugar and moderation in carbohydrate intake  Exercise recommendations include exercising 3-5 times per week  Subjective:      Patient ID: Erica Chase is a 54 y o  male  This is a 68-year-old white male presents to the office today with his father  Patient reports nasal congestion and cough  Symptoms began 3 days ago  He has not had any fever or chills  He denies myalgias  He denies sinus pain and sinus pressure  He denies shortness of breath  He denies sputum production  His dad tells me he started him on Robitussin DM  The following portions of the patient's history were reviewed and updated as appropriate: allergies, current medications, past family history, past medical history, past social history, past surgical history and problem list     Review of Systems   Gastrointestinal: Negative for abdominal distention, abdominal pain, constipation, diarrhea, nausea and vomiting           Objective:      /74   Pulse 92   Temp 98 9 °F (37 2 °C)   Ht 5' 2" (1 575 m)   Wt 74 2 kg (163 lb 9 6 oz)   SpO2 97%   BMI 29 92 kg/m² Physical Exam   Constitutional: He appears well-developed and well-nourished  No distress  HENT:   Head: Normocephalic and atraumatic  Right Ear: External ear normal    Left Ear: External ear normal    Mouth/Throat: Oropharynx is clear and moist  No oropharyngeal exudate  Nose was congested  Tympanic membranes were clear  No tenderness to palpation over the paranasal sinuses   Eyes: Pupils are equal, round, and reactive to light  No scleral icterus  Neck: Neck supple  No tracheal deviation present  No thyromegaly present  Cardiovascular: Normal rate, regular rhythm and normal heart sounds  Exam reveals no gallop and no friction rub  No murmur heard  Pulmonary/Chest: Effort normal and breath sounds normal  No stridor  No respiratory distress  He has no wheezes  He has no rales  Abdominal: Soft  Bowel sounds are normal  He exhibits no distension and no mass  There is no tenderness  There is no guarding  No organomegaly is noted   Lymphadenopathy:     He has no cervical adenopathy  Vitals reviewed      extremities:  Without cyanosis, clubbing, or edema

## 2020-03-18 ENCOUNTER — TRANSCRIBE ORDERS (OUTPATIENT)
Dept: ADMINISTRATIVE | Facility: HOSPITAL | Age: 50
End: 2020-03-18

## 2020-03-18 DIAGNOSIS — Z98.2 PRESENCE OF CEREBROSPINAL FLUID DRAINAGE DEVICE: Primary | ICD-10-CM

## 2020-03-19 ENCOUNTER — OFFICE VISIT (OUTPATIENT)
Dept: FAMILY MEDICINE CLINIC | Facility: CLINIC | Age: 50
End: 2020-03-19
Payer: MEDICARE

## 2020-03-19 VITALS
TEMPERATURE: 98.4 F | OXYGEN SATURATION: 97 % | DIASTOLIC BLOOD PRESSURE: 80 MMHG | BODY MASS INDEX: 31.15 KG/M2 | HEIGHT: 61 IN | WEIGHT: 165 LBS | SYSTOLIC BLOOD PRESSURE: 110 MMHG | HEART RATE: 75 BPM

## 2020-03-19 DIAGNOSIS — J30.1 SEASONAL ALLERGIC RHINITIS DUE TO POLLEN: Primary | ICD-10-CM

## 2020-03-19 DIAGNOSIS — J00 ACUTE NASOPHARYNGITIS (COMMON COLD): ICD-10-CM

## 2020-03-19 PROCEDURE — 1036F TOBACCO NON-USER: CPT | Performed by: FAMILY MEDICINE

## 2020-03-19 PROCEDURE — 99213 OFFICE O/P EST LOW 20 MIN: CPT | Performed by: FAMILY MEDICINE

## 2020-03-19 PROCEDURE — 3008F BODY MASS INDEX DOCD: CPT | Performed by: FAMILY MEDICINE

## 2020-03-19 RX ORDER — LEVETIRACETAM 500 MG/1
500 TABLET ORAL 2 TIMES DAILY
COMMUNITY
Start: 2020-03-06

## 2020-03-19 NOTE — ASSESSMENT & PLAN NOTE
As noted, he has a little bit of a postviral cough combine with his allergies which are starting  He may continue to use Robitussin DM  I reassured the patient's father that the patient is no longer contagious

## 2020-03-19 NOTE — PROGRESS NOTES
Assessment/Plan:    Allergic rhinitis due to pollen  Patient has allergic rhinitis  Think his allergies is just starting up  He likely also has some bronchial hyperactivity from his recent upper respiratory tract infection  He did not cough the entire time he was in the office  I advised his father to start him back up on the Claritin 10 mg daily again  Acute nasopharyngitis (common cold)  As noted, he has a little bit of a postviral cough combine with his allergies which are starting  He may continue to use Robitussin DM  I reassured the patient's father that the patient is no longer contagious  Diagnoses and all orders for this visit:    Seasonal allergic rhinitis due to pollen    Acute nasopharyngitis (common cold)    Other orders  -     levETIRAcetam (KEPPRA) 500 mg tablet; Take 500 mg by mouth 2 (two) times a day          Subjective:      Patient ID: Darwyn Goldmann is a 54 y o  male  This patient is a 73-year-old white male who presents to the office today with his father  Patient reports a dry cough  He has had no fever and chills  He has no sputum  No shortness of breath  His father reports he had a cough with his URI a few weeks ago  It got better but then he started with a dry cough 3 -4 days ago  It is only a slight cough and not very often  That is not sure if the patient's allergies are acting up as he does have history of allergies  He has no nasal congestion  No sneezing  No runny nose  No sore throat  No blocked ears and no earache  Patient's father's allergies have been acting up as have the patient's brothers allergies  The following portions of the patient's history were reviewed and updated as appropriate: allergies, current medications, past family history, past medical history, past social history, past surgical history and problem list     Review of Systems   Gastrointestinal: Negative for diarrhea, nausea and vomiting     Neurological: Negative for seizures and weakness  Objective:      /80 (BP Location: Right arm, Patient Position: Sitting, Cuff Size: Adult)   Pulse 75   Temp 98 4 °F (36 9 °C) (Tympanic)   Ht 5' 1" (1 549 m)   Wt 74 8 kg (165 lb)   SpO2 97%   BMI 31 18 kg/m²          Physical Exam   Constitutional:   This is a pleasant 61-year-old white male who appears his stated age  He was nonseptic in appearance and in no distress   HENT:   Head: Normocephalic and atraumatic  Right Ear: External ear normal    Left Ear: External ear normal    Mouth/Throat: Oropharynx is clear and moist  No oropharyngeal exudate  The nose was congested  The nasal turbinates are pale  There was no rhinorrhea noted  Eyes: Pupils are equal, round, and reactive to light  Conjunctivae are normal  No scleral icterus  Neck: Neck supple  No tracheal deviation present  No thyromegaly present  Cardiovascular: Normal rate, regular rhythm and normal heart sounds  Exam reveals no gallop and no friction rub  No murmur heard  Pulmonary/Chest: Effort normal and breath sounds normal  No stridor  No respiratory distress  He has no wheezes  He has no rales  Abdominal: Soft  Bowel sounds are normal  He exhibits no distension and no mass  There is no tenderness  There is no guarding  No organomegaly is noted   Lymphadenopathy:     He has no cervical adenopathy  Psychiatric: He has a normal mood and affect  His behavior is normal  Judgment and thought content normal    Vitals reviewed      extremities:  Without cyanosis, clubbing, or edema

## 2020-03-19 NOTE — PATIENT INSTRUCTIONS
Allergic Rhinitis   AMBULATORY CARE:   Allergic rhinitis , or hay fever, is swelling of the inside of your nose  The swelling is a reaction to allergens in the air  An allergen can be anything that causes an allergic reaction  Allergies to weeds, grass, trees, or mold often cause seasonal allergic rhinitis  Indoor dust mites, cockroaches, pet dander, or mold can also cause allergic rhinitis  Common signs and symptoms include the following:   · Sneezing    · Nasal congestion    · Runny nose    · Itchy nose, eyes, or mouth    · Red, watery eyes    · Postnasal drip (nasal drainage down the back of your throat)    · Cough or frequent throat clearing    · Feeling tired or lethargic    · Dark circles under your eyes  Call 911 for the following:   · You have chest pain or shortness of breath  Seek care immediately if:   · You have severe pain  · You cough up blood  Contact your healthcare provider if:   · You have a fever  · You have ear or sinus pain, or a headache  · Your symptoms get worse, even after treatment  · You have yellow, green, brown, or bloody mucus coming from your nose  · Your nose is bleeding or you have pain inside your nose  · You have trouble sleeping because of your symptoms  · You have questions or concerns about your condition or care  Treatment:   · Antihistamines  help reduce itching, sneezing, and a runny nose  Some antihistamines can make you sleepy  · Nasal steroids  help decrease inflammation in your nose  · Decongestants  help clear your stuffy nose  · Immunotherapy  may be needed if your symptoms are severe or other treatments do not work  Immunotherapy is used to inject an allergen into your skin  At first, the therapy contains tiny amounts of the allergen  Your healthcare provider will slowly increase the amount of allergen  This may help your body be less sensitive to the allergen and stop reacting to it   You may need immunotherapy for weeks or longer  Manage allergic rhinitis:  The best way to manage allergic rhinitis is to avoid allergens that can trigger your symptoms  Any of the following may help decrease your symptoms:  · Rinse your nose and sinuses  with a salt water solution or use a salt water nasal spray  This will help thin the mucus in your nose and rinse away pollen and dirt  It will also help reduce swelling so you can breathe normally  Ask your healthcare provider how often to rinse your nose  · Reduce exposure to dust mites  Wash sheets and towels in hot water every week  Cover your pillows and mattresses with allergen-free covers  Limit the number of stuffed animals and soft toys your child has  Wash your child's toys in hot water regularly  Vacuum weekly and use a vacuum  with an air filter  If possible, get rid of carpets and curtains  These collect dust and dust mites  · Reduce exposure to pollen  Keep windows and doors closed in your house and car  Stay inside when air pollution or the pollen count is high  Run your air conditioner on recycle, and change air filters often  Shower and wash your hair before bed every night to rinse away pollen  · Reduce exposure to pet dander  If possible, do not keep cats, dogs, birds, or other pets  If you do keep pets in your home, keep them out of bedrooms and carpeted rooms  Bathe them often  · Reduce exposure to mold  Do not spend time in basements  Choose artificial plants instead of live plants  Keep your home's humidity at less than 45%  Do not have ponds or standing water in your home or yard  · Do not smoke  Avoid others who smoke  Ask your healthcare provider for information if you currently smoke and need help to quit  Follow up with your healthcare provider as directed:  Write down your questions so you remember to ask them during your visits     © 2017 [employer] Information is for End User's use only and may not be sold, redistributed or otherwise used for commercial purposes  All illustrations and images included in CareNotes® are the copyrighted property of A D [employer] M [employer]  or [employer]tw[employer]en[employer]  The above information is an  only  It is not intended as medical advice for individual conditions or treatments  Talk to your doctor, nurse or pharmacist before following any medical regimen to see if it is safe and effective for you

## 2020-03-19 NOTE — ASSESSMENT & PLAN NOTE
Patient has allergic rhinitis  Think his allergies is just starting up  He likely also has some bronchial hyperactivity from his recent upper respiratory tract infection  He did not cough the entire time he was in the office  I advised his father to start him back up on the Claritin 10 mg daily again

## 2020-06-19 ENCOUNTER — HOSPITAL ENCOUNTER (OUTPATIENT)
Dept: CT IMAGING | Facility: HOSPITAL | Age: 50
Discharge: HOME | End: 2020-06-19
Payer: MEDICARE

## 2020-06-19 DIAGNOSIS — Z98.2 PRESENCE OF CEREBROSPINAL FLUID DRAINAGE DEVICE: ICD-10-CM

## 2020-06-19 PROCEDURE — 70450 CT HEAD/BRAIN W/O DYE: CPT

## 2020-07-31 ENCOUNTER — APPOINTMENT (OUTPATIENT)
Dept: LAB | Facility: HOSPITAL | Age: 50
End: 2020-07-31
Attending: FAMILY MEDICINE
Payer: MEDICARE

## 2020-07-31 ENCOUNTER — OFFICE VISIT (OUTPATIENT)
Dept: FAMILY MEDICINE CLINIC | Facility: CLINIC | Age: 50
End: 2020-07-31
Payer: MEDICARE

## 2020-07-31 VITALS
HEIGHT: 62 IN | BODY MASS INDEX: 29.74 KG/M2 | HEART RATE: 94 BPM | DIASTOLIC BLOOD PRESSURE: 78 MMHG | TEMPERATURE: 97.3 F | WEIGHT: 161.6 LBS | OXYGEN SATURATION: 97 % | SYSTOLIC BLOOD PRESSURE: 120 MMHG

## 2020-07-31 DIAGNOSIS — E71.40 CARNITINE DEFICIENCY (HCC): ICD-10-CM

## 2020-07-31 DIAGNOSIS — M54.50 ACUTE LEFT-SIDED LOW BACK PAIN WITHOUT SCIATICA: Primary | ICD-10-CM

## 2020-07-31 DIAGNOSIS — G40.909 SEIZURE DISORDER (HCC): ICD-10-CM

## 2020-07-31 DIAGNOSIS — Q03.0 HYDROCEPHALUS ASSOCIATED WITH CONGENITAL AQUEDUCT STENOSIS (HCC): ICD-10-CM

## 2020-07-31 LAB
BILIRUB UR QL STRIP: NEGATIVE
CLARITY UR: CLEAR
COLOR UR: YELLOW
GLUCOSE UR STRIP-MCNC: NEGATIVE MG/DL
HGB UR QL STRIP.AUTO: NEGATIVE
KETONES UR STRIP-MCNC: NEGATIVE MG/DL
LEUKOCYTE ESTERASE UR QL STRIP: NEGATIVE
NITRITE UR QL STRIP: NEGATIVE
PH UR STRIP.AUTO: 6 [PH]
PROT UR STRIP-MCNC: NEGATIVE MG/DL
SP GR UR STRIP.AUTO: 1.01 (ref 1–1.03)
UROBILINOGEN UR QL STRIP.AUTO: 0.2 E.U./DL

## 2020-07-31 PROCEDURE — 3008F BODY MASS INDEX DOCD: CPT | Performed by: FAMILY MEDICINE

## 2020-07-31 PROCEDURE — 99213 OFFICE O/P EST LOW 20 MIN: CPT | Performed by: FAMILY MEDICINE

## 2020-07-31 PROCEDURE — 81003 URINALYSIS AUTO W/O SCOPE: CPT | Performed by: FAMILY MEDICINE

## 2020-07-31 PROCEDURE — 1036F TOBACCO NON-USER: CPT | Performed by: FAMILY MEDICINE

## 2020-07-31 RX ORDER — NAPROXEN 500 MG/1
500 TABLET ORAL 2 TIMES DAILY WITH MEALS
Qty: 20 TABLET | Refills: 1 | Status: SHIPPED | OUTPATIENT
Start: 2020-07-31 | End: 2022-05-12 | Stop reason: ALTCHOICE

## 2020-07-31 NOTE — ASSESSMENT & PLAN NOTE
Patient was unable to void  I had to order a UA with reflex to culture to be done at the lab as an outpatient  I do believe the patient's symptoms are all muscular ligamentous  I do not think he has a kidney stone  I do not think he has pyelonephritis  I reassured the patient's father  I recommended he use moist heat to the affected area and rest   I started him on naproxen 500 mg twice a day with food  He can stop this when he feels better  He may continue to take Tylenol as needed  Return to office if no improvement or worsens

## 2020-07-31 NOTE — PROGRESS NOTES
Assessment/Plan:    Acute left-sided low back pain without sciatica  Patient was unable to void  I had to order a UA with reflex to culture to be done at the lab as an outpatient  I do believe the patient's symptoms are all muscular ligamentous  I do not think he has a kidney stone  I do not think he has pyelonephritis  I reassured the patient's father  I recommended he use moist heat to the affected area and rest   I started him on naproxen 500 mg twice a day with food  He can stop this when he feels better  He may continue to take Tylenol as needed  Return to office if no improvement or worsens  Diagnoses and all orders for this visit:    Acute left-sided low back pain without sciatica  -     UA w Reflex to Microscopic w Reflex to Culture -Lab Collect  -     naproxen (NAPROSYN) 500 mg tablet; Take 1 tablet (500 mg total) by mouth 2 (two) times a day with meals    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)    Seizure disorder (HCC)    Carnitine deficiency ([facility] )          Subjective:      Patient ID: Niurka Calix is a 52 y o  male  This is a 82-year-old white male who presents to the office today complaining of left-sided kidney pain  The patient reports a pain in his lower back that radiates into his left buttock  It started this morning  He reports it hurts to sit  He had a kidney stone years ago  He tells me that pain was very severe  Admits that this pain feels different  He points to his very low back, in the area of the iliac crest and in the buttock  He denies any incidents of trauma  He denies any hematuria  He was unable to give us a urine specimen today while in the office  He apparently voided just before leaving his house to come for his appointment        The following portions of the patient's history were reviewed and updated as appropriate: allergies, current medications, past family history, past medical history, past social history, past surgical history and problem list     Review of Systems   Genitourinary: Negative for dysuria, flank pain (Pain is in lower back not flank), frequency, hematuria and urgency  Neurological: Negative for dizziness, seizures, light-headedness and headaches  Objective:      /78 (BP Location: Left arm, Patient Position: Sitting, Cuff Size: Adult)   Pulse 94   Temp (!) 97 3 °F (36 3 °C) (Temporal)   Ht 5' 2" (1 575 m)   Wt 73 3 kg (161 lb 9 6 oz)   SpO2 97%   BMI 29 56 kg/m²          Physical Exam   Constitutional: He appears well-developed and well-nourished  No distress  HENT:   Head: Normocephalic and atraumatic  Right Ear: External ear normal    Left Ear: External ear normal    Mouth/Throat: Oropharynx is clear and moist  No oropharyngeal exudate  Eyes: Pupils are equal, round, and reactive to light  Conjunctivae are normal  Right eye exhibits no discharge  Left eye exhibits no discharge  No scleral icterus  Neck: Neck supple  No tracheal deviation present  No thyromegaly present  Cardiovascular: Normal rate, regular rhythm and normal heart sounds  Exam reveals no gallop and no friction rub  No murmur heard  Pulmonary/Chest: Effort normal and breath sounds normal  No stridor  No respiratory distress  He has no wheezes  He has no rales  Musculoskeletal:   Patient has tenderness noted to palpation in the left sacroiliac and over the left iliac crest   Pain prior dominantly over the left iliac crest   There is no CVA tenderness  Emily Lack sign is negative  I did not appreciate any muscle spasm  Patient has severely restricted lumbar flexion  This exacerbated his pain  Extension, side bending, and rotation were normal    Lymphadenopathy:     He has no cervical adenopathy  Neurological:   Deep tendon reflexes are 2/4  Motor strength is 5/5  Straight leg raising sign was negative bilaterally  Vitals reviewed  extremities: There was no leg length discrepancy noted    There is no cyanosis or clubbing

## 2020-07-31 NOTE — PATIENT INSTRUCTIONS
Acute Low Back Pain   WHAT YOU NEED TO KNOW:   What is acute low back pain? Acute low back pain is sudden discomfort in your lower back area that lasts for up to 6 weeks  The discomfort makes it difficult to tolerate activity  What causes or increases my risk for acute low back pain? Conditions that affect the spine, joints, or muscles can cause back pain  These may include arthritis, spinal stenosis (narrowing of the spinal column), muscle tension, or breakdown of the spinal discs  The following increase your risk of back pain:  · Repeated bending, lifting, or twisting, or lifting heavy items    · Injury from a fall or accident    · Lack of regular physical activity     · Obesity, pregnancy     · Smoking    · Aging    · Driving, sitting, or standing for long periods    · Bad posture while sitting or standing  How is acute low back pain diagnosed? Your healthcare provider will ask about your medical history and examine you  He may ask when you last had low back pain and how it started  Show him where you feel the pain and what makes it feel better or worse  Tell him about the type of pain you have, how bad it is, and how long it lasts  Tell him if your pain worsens at night or when you lie down on your back  How is acute low back pain treated? The goal of treatment is to relieve your pain and help you tolerate activity  Most people with acute lower back pain get better within 4 to 6 weeks  You may need any of the following:  · Medicines:      ¨ Acetaminophen  decreases pain  It is available without a doctor's order  Ask how much to take and how often to take it  Follow directions  Acetaminophen can cause liver damage if not taken correctly  ¨ NSAIDs  help decrease swelling and pain  This medicine is available with or without a doctor's order  NSAIDs can cause stomach bleeding or kidney problems in certain people   If you take blood thinner medicine, always ask your healthcare provider if NSAIDs are safe for you  Always read the medicine label and follow directions  ¨ Prescription pain medicine  may be given  Ask your healthcare provider how to take this medicine safely  ¨ Muscle relaxers  decrease pain by relaxing the muscles in your lower spine  What can I do to prevent low back pain? · Use proper body mechanics  ¨ Bend at the hips and knees when you  objects  Do not bend from the waist  Use your leg muscles as you lift the load  Do not use your back  Keep the object close to your chest as you lift it  Try not to twist or lift anything above your waist     ¨ Change your position often when you stand for long periods of time  Rest one foot on a small box or footrest, and then switch to the other foot often  ¨ Try not to sit for long periods of time  When you do, sit in a straight-backed chair with your feet flat on the floor  Never reach, pull, or push while you are sitting  · Do exercises that strengthen your back muscles  Warm up before you exercise  Ask your healthcare provider the best exercises for you  · Maintain a healthy weight  Ask your healthcare provider how much you should weigh  Ask him to help you create a weight loss plan if you are overweight  How can I take care of myself if I have low back pain? · Stay active  as much as you can without causing more pain  Bed rest could make your back pain worse  Start with some light exercises such as walking  Avoid heavy lifting until your pain is gone  Ask for more information about the activities or exercises that are right for you  · Ice  helps decrease swelling, pain, and muscle spams  Put crushed ice in a plastic bag  Cover it with a towel  Place it on your lower back for 20 to 30 minutes every 2 hours  Do this for about 2 to 3 days after your pain starts, or as directed  · Heat  helps decrease pain and muscle spasms  Start to use heat after treatment with ice has stopped   Use a small towel dampened with warm water or a heating pad, or sit in a warm bath  Apply heat on the area for 20 to 30 minutes every 2 hours for as many days as directed  Alternate heat and ice  When should I contact my healthcare provider? · You have a fever  · You have pain at night or when you rest     · Your pain does not get better with treatment  · You have pain that worsens when you cough or sneeze  · You suddenly feel something pop or snap in your back  · You have questions or concerns about your condition or care  When should I seek immediate care or call 911? · You have severe pain  · You have sudden stiffness and heaviness on both buttocks down to both legs  · You have numbness or weakness in one leg, or pain in both legs  · You have numbness in your genital area or across your lower back  · You cannot control your urine or bowel movements  CARE AGREEMENT:   You have the right to help plan your care  Learn about your health condition and how it may be treated  Discuss treatment options with your caregivers to decide what care you want to receive  You always have the right to refuse treatment  The above information is an  only  It is not intended as medical advice for individual conditions or treatments  Talk to your doctor, nurse or pharmacist before following any medical regimen to see if it is safe and effective for you  © 2017 [employer] [employer] Information is for End User's use only and may not be sold, redistributed or otherwise used for commercial purposes  All illustrations and images included in CareNotes® are the copyrighted property of [employer]  or [employer]tw[employer]en[employer]

## 2020-10-01 ENCOUNTER — OFFICE VISIT (OUTPATIENT)
Dept: FAMILY MEDICINE CLINIC | Facility: CLINIC | Age: 50
End: 2020-10-01
Payer: MEDICARE

## 2020-10-01 VITALS
DIASTOLIC BLOOD PRESSURE: 82 MMHG | SYSTOLIC BLOOD PRESSURE: 118 MMHG | TEMPERATURE: 98.3 F | BODY MASS INDEX: 29.06 KG/M2 | OXYGEN SATURATION: 98 % | WEIGHT: 157.9 LBS | HEIGHT: 62 IN | HEART RATE: 69 BPM

## 2020-10-01 DIAGNOSIS — Z23 IMMUNIZATION DUE: ICD-10-CM

## 2020-10-01 DIAGNOSIS — J30.1 SEASONAL ALLERGIC RHINITIS DUE TO POLLEN: ICD-10-CM

## 2020-10-01 DIAGNOSIS — Q03.0 HYDROCEPHALUS ASSOCIATED WITH CONGENITAL AQUEDUCT STENOSIS (HCC): Primary | ICD-10-CM

## 2020-10-01 DIAGNOSIS — G40.909 SEIZURE DISORDER (HCC): ICD-10-CM

## 2020-10-01 PROCEDURE — G0008 ADMIN INFLUENZA VIRUS VAC: HCPCS

## 2020-10-01 PROCEDURE — 90682 RIV4 VACC RECOMBINANT DNA IM: CPT

## 2020-10-01 PROCEDURE — 99213 OFFICE O/P EST LOW 20 MIN: CPT | Performed by: FAMILY MEDICINE

## 2021-03-10 DIAGNOSIS — Z23 ENCOUNTER FOR IMMUNIZATION: ICD-10-CM

## 2021-03-11 ENCOUNTER — TELEPHONE (OUTPATIENT)
Dept: FAMILY MEDICINE CLINIC | Facility: CLINIC | Age: 51
End: 2021-03-11

## 2021-03-11 ENCOUNTER — IMMUNIZATIONS (OUTPATIENT)
Dept: FAMILY MEDICINE CLINIC | Facility: HOSPITAL | Age: 51
End: 2021-03-11

## 2021-03-11 DIAGNOSIS — Z23 ENCOUNTER FOR IMMUNIZATION: Primary | ICD-10-CM

## 2021-03-11 PROCEDURE — 0011A SARS-COV-2 / COVID-19 MRNA VACCINE (MODERNA) 100 MCG: CPT

## 2021-03-11 PROCEDURE — 91301 SARS-COV-2 / COVID-19 MRNA VACCINE (MODERNA) 100 MCG: CPT

## 2021-03-11 NOTE — TELEPHONE ENCOUNTER
Family is concerned that pt started taking his LEVetiraCETAM 500MG 2x daily and     today he got the Covid19 vaccine  Medication lists not to have any vaccinations while on this drug  Asking if they should be concerned?

## 2021-04-08 ENCOUNTER — IMMUNIZATIONS (OUTPATIENT)
Dept: FAMILY MEDICINE CLINIC | Facility: HOSPITAL | Age: 51
End: 2021-04-08

## 2021-04-08 DIAGNOSIS — Z23 ENCOUNTER FOR IMMUNIZATION: Primary | ICD-10-CM

## 2021-04-08 PROCEDURE — 0012A SARS-COV-2 / COVID-19 MRNA VACCINE (MODERNA) 100 MCG: CPT

## 2021-04-08 PROCEDURE — 91301 SARS-COV-2 / COVID-19 MRNA VACCINE (MODERNA) 100 MCG: CPT

## 2021-08-16 ENCOUNTER — HOSPITAL ENCOUNTER (EMERGENCY)
Facility: HOSPITAL | Age: 51
Discharge: HOME | End: 2021-08-16
Attending: EMERGENCY MEDICINE
Payer: MEDICARE

## 2021-08-16 VITALS
RESPIRATION RATE: 18 BRPM | OXYGEN SATURATION: 99 % | SYSTOLIC BLOOD PRESSURE: 111 MMHG | BODY MASS INDEX: 27.75 KG/M2 | HEART RATE: 73 BPM | WEIGHT: 150.79 LBS | DIASTOLIC BLOOD PRESSURE: 61 MMHG | TEMPERATURE: 97.8 F | HEIGHT: 62 IN

## 2021-08-16 DIAGNOSIS — M79.645 PAIN OF LEFT MIDDLE FINGER: Primary | ICD-10-CM

## 2021-08-16 PROCEDURE — 99283 EMERGENCY DEPT VISIT LOW MDM: CPT

## 2021-08-16 PROCEDURE — 99282 EMERGENCY DEPT VISIT SF MDM: CPT | Performed by: EMERGENCY MEDICINE

## 2021-08-16 NOTE — ED PROVIDER NOTES
History  Chief Complaint   Patient presents with    Finger Pain     pt states L third fingers is painful, denies injury     82-year-old male presents for evaluation of left middle finger pain  Patient reports he is left-handed, he was feeling water jugs today when he began to experience left middle finger pain  Pain was located to the distal finger tip  Patient used ice and over-the-counter pain medicine at home  Patient reports during the pain he was unable to make a fist with his finger  Patient now is returned to baseline and is pain free  Patient uses his left hand freely and is able to make an open a fist   He denies any other injury to the finger  He denies color changes or swelling  He has been in his usual state of health  Prior to Admission Medications   Prescriptions Last Dose Informant Patient Reported?  Taking?   acetaminophen (TYLENOL) 500 mg tablet  Father Yes No   Sig: Take 500 mg by mouth every 4 (four) hours as needed   levETIRAcetam (KEPPRA) 500 mg tablet  Father Yes No   Sig: Take 500 mg by mouth 2 (two) times a day   levOCARNitine (CARNITOR) 330 MG tablet  Father Yes No   Sig: Take 330 mg by mouth 2 (two) times a day   loratadine (CLARITIN) 10 mg tablet  Father Yes No   Sig: Take 10 mg by mouth daily   naproxen (NAPROSYN) 500 mg tablet  Father No No   Sig: Take 1 tablet (500 mg total) by mouth 2 (two) times a day with meals   Patient not taking: Reported on 10/1/2020      Facility-Administered Medications: None       Past Medical History:   Diagnosis Date    Hernia, abdominal     Hydrocephaly (HCC)     Seizures (HCC)     Spina bifida ([address] )        Past Surgical History:   Procedure Laterality Date    ABDOMINAL SURGERY      BACK SURGERY      OPEN SPINE AT BIRTH    CSF SHUNT      EYE SURGERY         Family History   Problem Relation Age of Onset    No Known Problems Mother     No Known Problems Father      I have reviewed and agree with the history as documented  E-Cigarette/Vaping    E-Cigarette Use Never User      E-Cigarette/Vaping Substances    Nicotine No     THC No     CBD No     Flavoring No     Other No     Unknown No      Social History     Tobacco Use    Smoking status: Never Smoker    Smokeless tobacco: Never Used   Vaping Use    Vaping Use: Never used   Substance Use Topics    Alcohol use: No    Drug use: No       Review of Systems   Musculoskeletal: Positive for arthralgias  Negative for joint swelling  Skin: Negative for color change and wound  All other systems reviewed and are negative  Physical Exam  Physical Exam  Vitals reviewed  Constitutional:       General: He is not in acute distress  Appearance: Normal appearance  He is not ill-appearing, toxic-appearing or diaphoretic  HENT:      Head: Normocephalic and atraumatic  Right Ear: External ear normal       Left Ear: External ear normal    Eyes:      General:         Right eye: No discharge  Left eye: No discharge  Cardiovascular:      Rate and Rhythm: Normal rate  Pulmonary:      Effort: Pulmonary effort is normal  No respiratory distress  Musculoskeletal:         General: No tenderness, deformity or signs of injury  Comments: Left middle finger without swelling, erythema, non-tender, cap refill <2sec, radial pulse 2/4,  strength 5/5, able to open and close fist   Skin:     General: Skin is warm  Coloration: Skin is not jaundiced or pale  Findings: No erythema  Neurological:      General: No focal deficit present  Mental Status: He is alert  Mental status is at baseline           Vital Signs  ED Triage Vitals [08/16/21 1446]   Temperature Pulse Respirations Blood Pressure SpO2   97 8 °F (36 6 °C) 73 18 111/61 99 %      Temp Source Heart Rate Source Patient Position - Orthostatic VS BP Location FiO2 (%)   Temporal Monitor Sitting Right arm --      Pain Score       --           Vitals:    08/16/21 1446   BP: 111/61 Pulse: 73   Patient Position - Orthostatic VS: Sitting         Visual Acuity      ED Medications  Medications - No data to display    Diagnostic Studies  Results Reviewed     None                 No orders to display              Procedures  Procedures         ED Course                                           MDM  Number of Diagnoses or Management Options  Pain of left middle finger  Diagnosis management comments: 45-year-old male presents for evaluation of left middle finger pain  Presently patient is pain free, he previously was unable to open and close the fist however now can do so freely  is possible patient suffered a minor dislocation of his finger however he denies other overt trauma, finger is nontender along the length  Will will defer x-ray imaging at this time given normal appearing finger and no deficits  Will discharge patient home  Disposition  Final diagnoses:   Pain of left middle finger     Time reflects when diagnosis was documented in both MDM as applicable and the Disposition within this note     Time User Action Codes Description Comment    8/16/2021  3:26 PM Mike Boy Add [C32 580] Pain of left middle finger       ED Disposition     ED Disposition Condition Date/Time Comment    Discharge Stable Mon Aug 16, 2021  3:26 PM [facility]a [facility] discharge to home/self care              Follow-up Information     Follow up With Specialties Details Why Yaima, [address]  [address]  [facility] [address]  [phone]            Discharge Medication List as of 8/16/2021  3:31 PM      CONTINUE these medications which have NOT CHANGED    Details   acetaminophen (TYLENOL) 500 mg tablet Take 500 mg by mouth every 4 (four) hours as needed, Starting Thu 1/17/2019, Historical Med      levETIRAcetam (KEPPRA) 500 mg tablet Take 500 mg by mouth 2 (two) times a day, Starting Fri 3/6/2020, Historical Med      levOCARNitine (CARNITOR) 330 MG tablet Take 330 mg by mouth 2 (two) times a day, Historical Med      loratadine (CLARITIN) 10 mg tablet Take 10 mg by mouth daily, Historical Med      naproxen (NAPROSYN) 500 mg tablet Take 1 tablet (500 mg total) by mouth 2 (two) times a day with meals, Starting Fri 7/31/2020, Normal           No discharge procedures on file      PDMP Review     None          ED Provider  Electronically Signed by           Mariely Dupree DO  08/17/21 6425

## 2021-08-25 ENCOUNTER — PATIENT OUTREACH (OUTPATIENT)
Dept: CASE MANAGEMENT | Facility: HOSPITAL | Age: 51
End: 2021-08-25

## 2021-08-25 NOTE — PROGRESS NOTES
Outpatient Care Management Note:  Patient was identified via report as having a recent non urgent and non life threatening ED visit at [address] [place] [address]  Chart reviewed  Patient was in the ED on 08/16/21 for evaluation of finger pain after doing some heavy liftting  Per chart review patient cancelled a follow up appointment with his PCP  ISSA questionnaire sent via [employer] to assess for social needs

## 2021-09-07 ENCOUNTER — PATIENT OUTREACH (OUTPATIENT)
Dept: CASE MANAGEMENT | Facility: HOSPITAL | Age: 51
End: 2021-09-07

## 2021-11-08 ENCOUNTER — IMMUNIZATIONS (OUTPATIENT)
Dept: FAMILY MEDICINE CLINIC | Facility: CLINIC | Age: 51
End: 2021-11-08
Payer: MEDICARE

## 2021-11-08 DIAGNOSIS — Z23 ENCOUNTER FOR IMMUNIZATION: Primary | ICD-10-CM

## 2021-11-08 PROCEDURE — G0008 ADMIN INFLUENZA VIRUS VAC: HCPCS

## 2021-11-08 PROCEDURE — 90682 RIV4 VACC RECOMBINANT DNA IM: CPT

## 2021-11-15 ENCOUNTER — IMMUNIZATIONS (OUTPATIENT)
Dept: FAMILY MEDICINE CLINIC | Facility: HOSPITAL | Age: 51
End: 2021-11-15

## 2021-11-15 DIAGNOSIS — Z23 ENCOUNTER FOR IMMUNIZATION: Primary | ICD-10-CM

## 2021-11-15 PROCEDURE — 91306 COVID-19 MODERNA VACC 0.25 ML BOOSTER: CPT

## 2021-11-15 PROCEDURE — 0013A COVID-19 MODERNA VACC 0.25 ML BOOSTER: CPT

## 2022-05-12 ENCOUNTER — OFFICE VISIT (OUTPATIENT)
Dept: FAMILY MEDICINE CLINIC | Facility: CLINIC | Age: 52
End: 2022-05-12
Payer: MEDICARE

## 2022-05-12 VITALS
SYSTOLIC BLOOD PRESSURE: 110 MMHG | DIASTOLIC BLOOD PRESSURE: 70 MMHG | BODY MASS INDEX: 28.16 KG/M2 | OXYGEN SATURATION: 98 % | HEART RATE: 57 BPM | HEIGHT: 62 IN | TEMPERATURE: 97.2 F | WEIGHT: 153 LBS

## 2022-05-12 DIAGNOSIS — M65.339 TRIGGER MIDDLE FINGER, UNSPECIFIED LATERALITY: ICD-10-CM

## 2022-05-12 DIAGNOSIS — E71.40 CARNITINE DEFICIENCY (HCC): ICD-10-CM

## 2022-05-12 DIAGNOSIS — Z79.899 ENCOUNTER FOR LONG-TERM (CURRENT) USE OF MEDICATIONS: ICD-10-CM

## 2022-05-12 DIAGNOSIS — G40.909 SEIZURE DISORDER (HCC): Primary | ICD-10-CM

## 2022-05-12 DIAGNOSIS — M41.125 ADOLESCENT IDIOPATHIC SCOLIOSIS OF THORACOLUMBAR REGION: ICD-10-CM

## 2022-05-12 DIAGNOSIS — E78.5 DYSLIPIDEMIA: ICD-10-CM

## 2022-05-12 DIAGNOSIS — G91.1 OBSTRUCTIVE HYDROCEPHALUS (HCC): ICD-10-CM

## 2022-05-12 DIAGNOSIS — Q03.0 HYDROCEPHALUS ASSOCIATED WITH CONGENITAL AQUEDUCT STENOSIS (HCC): ICD-10-CM

## 2022-05-12 PROCEDURE — 99214 OFFICE O/P EST MOD 30 MIN: CPT | Performed by: FAMILY MEDICINE

## 2022-05-12 NOTE — PROGRESS NOTES
Assessment/Plan:    Seizure disorder [facility])  Patient has been seizure-free since late 2018  I completed his forms  I ordered a Keppra level  Obstructive hydrocephalus ([facility] )  Patient is had no problems with his shunt since its replacement in early 2019    Carnitine deficiency [facility])  The patient will continue L quarantine    Adolescent idiopathic scoliosis of thoracolumbar region  Patient has scoliosis which is likely the etiology of his back pain  I recommended stretching exercises for the patient as well as walking regularly for exercise    Trigger middle finger  Patient has trigger finger of both hands  Symptoms are mild  At this time, I am not recommending any treatment  I did discuss treatment options with the patient which could include cortisone injections as well as surgical intervention  Diagnoses and all orders for this visit:    Seizure disorder ([facility] )  -     UA (URINE) with reflex to Scope  -     Levetiracetam level; Future    Encounter for long-term (current) use of medications  -     CBC and differential; Future  -     Levetiracetam level; Future    Dyslipidemia  -     Comprehensive metabolic panel; Future  -     Lipid panel; Future    Obstructive hydrocephalus (HCC)    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)    Carnitine deficiency ([facility] )    Adolescent idiopathic scoliosis of thoracolumbar region    Trigger middle finger, unspecified laterality      BMI Counseling: Body mass index is 27 98 kg/m²  The BMI is above normal  Nutrition recommendations include reducing portion sizes and consuming healthier snacks  Exercise recommendations include exercising 3-5 times per week  Subjective:      Patient ID: Maritza Gomez is a 54 y o  male  Patient is a 42-year-old white male who presents to the office today accompanied by his father  He is here today for a physical for access care  I have not seen this patient now for approximately 1 and half years    His father tells me he has not been getting any services because of the COVID pandemic so he was not brought in last year for physical   The patient will be turning 50years old in another 5 weeks  He complains today of right-sided low back pain  Apparently, this is a new complaint in just started today  He also complains of pain in his middle fingers  He reports they are locking  The following portions of the patient's history were reviewed and updated as appropriate: allergies, current medications, past family history, past medical history, past social history, past surgical history and problem list     Review of Systems   Constitutional: Negative for appetite change and unexpected weight change  Respiratory: Negative for cough and shortness of breath  Cardiovascular: Negative for chest pain, palpitations and leg swelling  Gastrointestinal: Negative for abdominal distention, abdominal pain, blood in stool, constipation and diarrhea  Musculoskeletal: Positive for arthralgias and back pain  Neurological: Negative for dizziness, seizures, light-headedness and headaches  Objective:      /70 (BP Location: Right arm, Patient Position: Sitting, Cuff Size: Adult)   Pulse 57   Temp (!) 97 2 °F (36 2 °C) (Tympanic)   Ht 5' 2" (1 575 m)   Wt 69 4 kg (153 lb)   SpO2 98%   BMI 27 98 kg/m²          Physical Exam  Vitals reviewed  Constitutional:       Comments: This is a 79-year-old white male who appears his stated age  He is well nourished and in no apparent distress   HENT:      Head: Normocephalic and atraumatic  Right Ear: Tympanic membrane, ear canal and external ear normal  There is no impacted cerumen  Left Ear: Tympanic membrane, ear canal and external ear normal  There is no impacted cerumen  Mouth/Throat:      Mouth: Mucous membranes are moist       Pharynx: Oropharynx is clear  No oropharyngeal exudate or posterior oropharyngeal erythema     Eyes:      General: No scleral icterus  Right eye: No discharge  Left eye: No discharge  Conjunctiva/sclera: Conjunctivae normal       Pupils: Pupils are equal, round, and reactive to light  Cardiovascular:      Rate and Rhythm: Normal rate and regular rhythm  Heart sounds: Normal heart sounds  No murmur heard  No friction rub  No gallop  Pulmonary:      Effort: Pulmonary effort is normal  No respiratory distress  Breath sounds: Normal breath sounds  No stridor  No wheezing, rhonchi or rales  Abdominal:      General: Bowel sounds are normal  There is no distension  Palpations: Abdomen is soft  There is no mass  Tenderness: There is no abdominal tenderness  There is no guarding  Genitourinary:     Comments: Prostate was smooth but enlarged  There were no nodules  Stool was brown and heme-negative  Musculoskeletal:      Cervical back: Neck supple  Lymphadenopathy:      Cervical: No cervical adenopathy  Psychiatric:         Mood and Affect: Mood normal          Behavior: Behavior normal          Thought Content:  Thought content normal          Judgment: Judgment normal        extremities:  Without cyanosis, clubbing, or edema

## 2022-05-12 NOTE — PROGRESS NOTES
Assessment and Plan:     Problem List Items Addressed This Visit    None          Preventive health issues were discussed with patient, and age appropriate screening tests were ordered as noted in patient's After Visit Summary  Personalized health advice and appropriate referrals for health education or preventive services given if needed, as noted in patient's After Visit Summary       History of Present Illness:     Patient presents for Medicare Annual Wellness visit    Patient Care Team:  Priyanka Sapp DO as PCP - General (Family Medicine)     Problem List:     Patient Active Problem List   Diagnosis    Allergic contact dermatitis, unspecified cause    Acute nasopharyngitis (common cold)    Allergic rhinitis due to pollen    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)    Seizure disorder ([address] )    Carnitine deficiency ([facility] [address] [facility] )    Acute left-sided low back pain without sciatica    Immunization due      Past Medical and Surgical History:     Past Medical History:   Diagnosis Date    Hernia, abdominal     Hydrocephaly ([facility] [address] )     Seizures ([address] )     Spina bifida ([facility] [address] [facility] )      Past Surgical History:   Procedure Laterality Date    ABDOMINAL SURGERY      BACK SURGERY      OPEN SPINE AT BIRTH    CSF SHUNT      EYE SURGERY        Family History:     Family History   Problem Relation Age of Onset    No Known Problems Mother     No Known Problems Father       Social History:     Social History     Socioeconomic History    Marital status: Single     Spouse name: None    Number of children: None    Years of education: None    Highest education level: None   Occupational History    None   Tobacco Use    Smoking status: Never Smoker    Smokeless tobacco: Never Used   Vaping Use    Vaping Use: Never used   Substance and Sexual Activity    Alcohol use: No    Drug use: No    Sexual activity: None   Other Topics Concern    None   Social History Narrative    None     Social Determinants of Health     Financial Resource Strain: Not on file   Food Insecurity: Not on file   Transportation Needs: Not on file   Physical Activity: Not on file   Stress: Not on file   Social Connections: Not on file   Intimate Partner Violence: Not on file   Housing Stability: Not on file      Medications and Allergies:     Current Outpatient Medications   Medication Sig Dispense Refill    acetaminophen (TYLENOL) 500 mg tablet Take 500 mg by mouth every 4 (four) hours as needed      levETIRAcetam (KEPPRA) 500 mg tablet Take 500 mg by mouth 2 (two) times a day      levOCARNitine (CARNITOR) 330 MG tablet Take 330 mg by mouth 2 (two) times a day      loratadine (CLARITIN) 10 mg tablet Take 10 mg by mouth daily       No current facility-administered medications for this visit       Allergies   Allergen Reactions    Other      Seasonal   Other reaction(s): Unknown Reaction      Immunizations:     Immunization History   Administered Date(s) Administered    COVID-19 MODERNA VACC 0 25 ML IM BOOSTER 11/15/2021    COVID-19 MODERNA VACC 0 5 ML IM 03/11/2021, 04/08/2021    Influenza, recombinant, quadrivalent,injectable, preservative free 10/04/2019, 10/01/2020, 11/08/2021      Health Maintenance:         Topic Date Due    Hepatitis C Screening  Never done    HIV Screening  Never done    Colorectal Cancer Screening  Never done         Topic Date Due    DTaP,Tdap,and Td Vaccines (1 - Tdap) Never done      Medicare Health Risk Assessment:     /70 (BP Location: Right arm, Patient Position: Sitting, Cuff Size: Adult)   Pulse 57   Temp (!) 97 2 °F (36 2 °C) (Tympanic)   Ht 5' 2" (1 575 m)   Wt 69 4 kg (153 lb)   SpO2 98%   BMI 27 98 kg/m²      Annual Wellness Visit    Anne Peng DO

## 2022-05-13 NOTE — ASSESSMENT & PLAN NOTE
Patient has scoliosis which is likely the etiology of his back pain    I recommended stretching exercises for the patient as well as walking regularly for exercise

## 2022-05-13 NOTE — ASSESSMENT & PLAN NOTE
Patient has trigger finger of both hands  Symptoms are mild  At this time, I am not recommending any treatment  I did discuss treatment options with the patient which could include cortisone injections as well as surgical intervention

## 2022-05-16 ENCOUNTER — APPOINTMENT (OUTPATIENT)
Dept: LAB | Facility: CLINIC | Age: 52
End: 2022-05-16
Payer: MEDICARE

## 2022-05-16 ENCOUNTER — CLINICAL SUPPORT (OUTPATIENT)
Dept: FAMILY MEDICINE CLINIC | Facility: CLINIC | Age: 52
End: 2022-05-16
Payer: MEDICARE

## 2022-05-16 DIAGNOSIS — G40.909 SEIZURE DISORDER (HCC): ICD-10-CM

## 2022-05-16 DIAGNOSIS — Z23 ENCOUNTER FOR IMMUNIZATION: Primary | ICD-10-CM

## 2022-05-16 DIAGNOSIS — E78.5 DYSLIPIDEMIA: ICD-10-CM

## 2022-05-16 DIAGNOSIS — Z79.899 ENCOUNTER FOR LONG-TERM (CURRENT) USE OF MEDICATIONS: ICD-10-CM

## 2022-05-16 LAB
ALBUMIN SERPL BCP-MCNC: 3.8 G/DL (ref 3.5–5)
ALP SERPL-CCNC: 89 U/L (ref 46–116)
ALT SERPL W P-5'-P-CCNC: 28 U/L (ref 12–78)
ANION GAP SERPL CALCULATED.3IONS-SCNC: 0 MMOL/L (ref 4–13)
AST SERPL W P-5'-P-CCNC: 20 U/L (ref 5–45)
BASOPHILS # BLD AUTO: 0.02 THOUSANDS/ΜL (ref 0–0.1)
BASOPHILS NFR BLD AUTO: 0 % (ref 0–1)
BILIRUB SERPL-MCNC: 0.34 MG/DL (ref 0.2–1)
BILIRUB UR QL STRIP: NEGATIVE
BUN SERPL-MCNC: 14 MG/DL (ref 5–25)
CALCIUM SERPL-MCNC: 9.2 MG/DL (ref 8.3–10.1)
CHLORIDE SERPL-SCNC: 112 MMOL/L (ref 100–108)
CHOLEST SERPL-MCNC: 141 MG/DL
CLARITY UR: CLEAR
CO2 SERPL-SCNC: 32 MMOL/L (ref 21–32)
COLOR UR: NORMAL
CREAT SERPL-MCNC: 1.03 MG/DL (ref 0.6–1.3)
EOSINOPHIL # BLD AUTO: 0.09 THOUSAND/ΜL (ref 0–0.61)
EOSINOPHIL NFR BLD AUTO: 2 % (ref 0–6)
ERYTHROCYTE [DISTWIDTH] IN BLOOD BY AUTOMATED COUNT: 11.9 % (ref 11.6–15.1)
GFR SERPL CREATININE-BSD FRML MDRD: 84 ML/MIN/1.73SQ M
GLUCOSE P FAST SERPL-MCNC: 90 MG/DL (ref 65–99)
GLUCOSE UR STRIP-MCNC: NEGATIVE MG/DL
HCT VFR BLD AUTO: 41.6 % (ref 36.5–49.3)
HDLC SERPL-MCNC: 50 MG/DL
HGB BLD-MCNC: 13.9 G/DL (ref 12–17)
HGB UR QL STRIP.AUTO: NEGATIVE
IMM GRANULOCYTES # BLD AUTO: 0.01 THOUSAND/UL (ref 0–0.2)
IMM GRANULOCYTES NFR BLD AUTO: 0 % (ref 0–2)
KETONES UR STRIP-MCNC: NEGATIVE MG/DL
LDLC SERPL CALC-MCNC: 76 MG/DL (ref 0–100)
LEUKOCYTE ESTERASE UR QL STRIP: NEGATIVE
LYMPHOCYTES # BLD AUTO: 1.43 THOUSANDS/ΜL (ref 0.6–4.47)
LYMPHOCYTES NFR BLD AUTO: 25 % (ref 14–44)
MCH RBC QN AUTO: 30.8 PG (ref 26.8–34.3)
MCHC RBC AUTO-ENTMCNC: 33.4 G/DL (ref 31.4–37.4)
MCV RBC AUTO: 92 FL (ref 82–98)
MONOCYTES # BLD AUTO: 0.35 THOUSAND/ΜL (ref 0.17–1.22)
MONOCYTES NFR BLD AUTO: 6 % (ref 4–12)
NEUTROPHILS # BLD AUTO: 3.75 THOUSANDS/ΜL (ref 1.85–7.62)
NEUTS SEG NFR BLD AUTO: 67 % (ref 43–75)
NITRITE UR QL STRIP: NEGATIVE
NONHDLC SERPL-MCNC: 91 MG/DL
NRBC BLD AUTO-RTO: 0 /100 WBCS
PH UR STRIP.AUTO: 5.5 [PH]
PLATELET # BLD AUTO: 176 THOUSANDS/UL (ref 149–390)
PMV BLD AUTO: 11.1 FL (ref 8.9–12.7)
POTASSIUM SERPL-SCNC: 4 MMOL/L (ref 3.5–5.3)
PROT SERPL-MCNC: 6.8 G/DL (ref 6.4–8.2)
PROT UR STRIP-MCNC: NEGATIVE MG/DL
RBC # BLD AUTO: 4.52 MILLION/UL (ref 3.88–5.62)
SODIUM SERPL-SCNC: 144 MMOL/L (ref 136–145)
SP GR UR STRIP.AUTO: 1.02 (ref 1–1.03)
TRIGL SERPL-MCNC: 77 MG/DL
UROBILINOGEN UR STRIP-ACNC: <2 MG/DL
WBC # BLD AUTO: 5.65 THOUSAND/UL (ref 4.31–10.16)

## 2022-05-16 PROCEDURE — 36415 COLL VENOUS BLD VENIPUNCTURE: CPT

## 2022-05-16 PROCEDURE — 80177 DRUG SCRN QUAN LEVETIRACETAM: CPT

## 2022-05-16 PROCEDURE — 80053 COMPREHEN METABOLIC PANEL: CPT

## 2022-05-16 PROCEDURE — 86580 TB INTRADERMAL TEST: CPT

## 2022-05-16 PROCEDURE — 81003 URINALYSIS AUTO W/O SCOPE: CPT | Performed by: FAMILY MEDICINE

## 2022-05-16 PROCEDURE — 80061 LIPID PANEL: CPT

## 2022-05-16 PROCEDURE — 85025 COMPLETE CBC W/AUTO DIFF WBC: CPT

## 2022-05-18 ENCOUNTER — CLINICAL SUPPORT (OUTPATIENT)
Dept: FAMILY MEDICINE CLINIC | Facility: CLINIC | Age: 52
End: 2022-05-18

## 2022-05-18 DIAGNOSIS — Z11.1 ENCOUNTER FOR PPD SKIN TEST READING: Primary | ICD-10-CM

## 2022-05-18 LAB
INDURATION: 0 MM
TB SKIN TEST: NEGATIVE

## 2022-05-20 LAB — LEVETIRACETAM SERPL-MCNC: 9.7 UG/ML (ref 10–40)

## 2022-08-25 ENCOUNTER — TELEMEDICINE (OUTPATIENT)
Dept: FAMILY MEDICINE CLINIC | Facility: CLINIC | Age: 52
End: 2022-08-25
Payer: MEDICARE

## 2022-08-25 VITALS
WEIGHT: 150 LBS | HEART RATE: 98 BPM | SYSTOLIC BLOOD PRESSURE: 107 MMHG | HEIGHT: 62 IN | TEMPERATURE: 101.2 F | DIASTOLIC BLOOD PRESSURE: 78 MMHG | BODY MASS INDEX: 27.6 KG/M2

## 2022-08-25 DIAGNOSIS — U07.1 COVID-19 VIRUS INFECTION: Primary | ICD-10-CM

## 2022-08-25 DIAGNOSIS — B34.9 VIRAL INFECTION, UNSPECIFIED: ICD-10-CM

## 2022-08-25 LAB
SARS-COV-2 AG UPPER RESP QL IA: POSITIVE
VALID CONTROL: ABNORMAL

## 2022-08-25 PROCEDURE — NC001 PR NO CHARGE: Performed by: FAMILY MEDICINE

## 2022-08-25 PROCEDURE — 99442 PR PHYS/QHP TELEPHONE EVALUATION 11-20 MIN: CPT | Performed by: FAMILY MEDICINE

## 2022-08-25 RX ORDER — NIRMATRELVIR AND RITONAVIR 300-100 MG
3 KIT ORAL 2 TIMES DAILY
Qty: 30 TABLET | Refills: 0 | Status: SHIPPED | OUTPATIENT
Start: 2022-08-25 | End: 2022-08-30

## 2022-08-25 RX ORDER — NIRMATRELVIR AND RITONAVIR 300-100 MG
3 KIT ORAL 2 TIMES DAILY
Qty: 30 TABLET | Refills: 0 | Status: SHIPPED | OUTPATIENT
Start: 2022-08-25 | End: 2022-08-25 | Stop reason: SDUPTHER

## 2022-08-25 NOTE — PROGRESS NOTES
COVID-19 Outpatient Progress Note    Assessment/Plan:    Problem List Items Addressed This Visit        Other    COVID-19 virus infection - Primary     This is a 61-year-old white male who was evaluated today via a telephone visit  We attempted a video visit but the patient and his father were unable to do this  His father could not understand that he had to on mute his microphone  As such, was converted to a telephone visit  The patient's symptoms were very suspicious for COVID-19  They did not have any test kits at home so I had the patient come to the office  The patient had a COVID-19 rapid antigen test in the office which was positive  I explained to the patient and his parents the diagnosis  This is day #1  The patient is fully vaccinated  He received 1 booster  Because of his underlying medical conditions, he is at risk for progression of disease  He has a candidate for antiviral therapy  The patient and his parents were agreeable  The patient was started on Paxlovid  I also recommended he take vitamin-C, vitamin-D, and zinc   I recommended at least a 5 day quarantine  If he is afebrile for 24 hours without taking any antipyretics, he may break quarantine, providing he can wear mask for 5 additional days any time he is around any other individuals  Otherwise, he will need to quarantine for a full 10 days  I advised the patient to drink plenty of fluids  Call me immediately if he has any shortness of breath         Relevant Medications    nirmatrelvir & ritonavir (Paxlovid, 300/100,) tablet therapy pack    Viral infection, unspecified    Relevant Medications    nirmatrelvir & ritonavir (Paxlovid, 300/100,) tablet therapy pack    Other Relevant Orders    Poct Covid 19 Rapid Antigen Test (Completed)         Disposition:     Patient has asymptomatic or mild COVID-19 infection  Based off CDC guidelines, they were recommended to isolate for 5 days   If they are asymptomatic or symptoms are improving with no fevers in the past 24 hours, isolation may be ended followed by 5 days of wearing a mask when around othes to minimize risk of infecting others  If still have a fever or other symptoms have not improved, continue to isolate until they improve  Regardless of when they end isolation, avoid being around people who are more likely to get very sick from COVID-19 until at least day 11  Discussed symptom directed medication options with patient  Discussed vitamin D, vitamin C, and/or zinc supplementation with patient  Patient meets criteria for PAXLOVID and they have been counseled appropriately according to EUA documentation released by the FDA  After discussion, patient agrees to treatment  [address] is an investigational medicine used to treat mild-to-moderate COVID-19 in adults and children (15years of age and older weighing at least 80 pounds (40 kg)) with positive results of direct SARS-CoV-2 viral testing, and who are at high risk for progression to severe COVID-19, including hospitalization or death  PAXLOVID is investigational because it is still being studied  There is limited information about the safety and effectiveness of using PAXLOVID to treat people with mild-to-moderate COVID-19  The FDA has authorized the emergency use of PAXLOVID for the treatment of mild-tomoderate COVID-19 in adults and children (15years of age and older weighing at least 80 pounds (40 kg)) with a positive test for the virus that causes COVID-19, and who are at high risk for progression to severe COVID-19, including hospitalization or death, under an EUA  What should I tell my healthcare provider before I take PAXLOVID?     Tell your healthcare provider if you:  - Have any allergies  - Have liver or kidney disease  - Are pregnant or plan to become pregnant  - Are breastfeeding a child  - Have any serious illnesses    Tell your healthcare provider about all the medicines you take, including prescription and over-the-counter medicines, vitamins, and herbal supplements  Some medicines may interact with PAXLOVID and may cause serious side effects  Keep a list of your medicines to show your healthcare provider and pharmacist when you get a new medicine  You can ask your healthcare provider or pharmacist for a list of medicines that interact with PAXLOVID  Do not start taking a new medicine without telling your healthcare provider  Your healthcare provider can tell you if it is safe to take PAXLOVID with other medicines  Tell your healthcare provider if you are taking combined hormonal contraceptive  PAXLOVID may affect how your birth control pills work  Females who are able to become pregnant should use another effective alternative form of contraception or an additional barrier method of contraception  Talk to your healthcare provider if you have any questions about contraceptive methods that might be right for you  How do I take PAXLOVID? PAXLOVID consists of 2 medicines: nirmatrelvir and ritonavir  - Take 2 pink tablets of nirmatrelvir with 1 white tablet of ritonavir by mouth 2 times each day (in the morning and in the evening) for 5 days  For each dose, take all 3 tablets at the same time  - If you have kidney disease, talk to your healthcare provider  You may need a different dose  - Swallow the tablets whole  Do not chew, break, or crush the tablets  - Take PAXLOVID with or without food  - Do not stop taking PAXLOVID without talking to your healthcare provider, even if you feel better  - If you miss a dose of PAXLOVID within 8 hours of the time it is usually taken, take it as soon as you remember  If you miss a dose by more than 8 hours, skip the missed dose and take the next dose at your regular time  Do not take 2 doses of PAXLOVID at the same time  - If you take too much PAXLOVID, call your healthcare provider or go to the nearest hospital emergency room right away    - If you are taking a ritonavir- or cobicistat-containing medicine to treat hepatitis C or Human Immunodeficiency Virus (HIV), you should continue to take your medicine as prescribed by your healthcare provider   - Talk to your healthcare provider if you do not feel better or if you feel worse after 5 days  Who should generally not take PAXLOVID? Do not take PAXLOVID if:  You are allergic to nirmatrelvir, ritonavir, or any of the ingredients in PAXLOVID  You are taking any of the following medicines:  - Alfuzosin  - Pethidine, piroxicam, propoxyphene  - Ranolazine  - Amiodarone, dronedarone, flecainide, propafenone, quinidine  - Colchicine  - Lurasidone, pimozide, clozapine  - Dihydroergotamine, ergotamine, methylergonovine  - Lovastatin, simvastatin  - Sildenafil (Revatio®) for pulmonary arterial hypertension (PAH)  - Triazolam, oral midazolam  - Apalutamide  - Carbamazepine, phenobarbital, phenytoin  - Rifampin  - St  Raymonds Wort (hypericum perforatum)    What are the important possible side effects of PAXLOVID? Possible side effects of PAXLOVID are:  - Liver Problems  Tell your healthcare provider right away if you have any of these signs and symptoms of liver problems: loss of appetite, yellowing of your skin and the whites of eyes (jaundice), dark-colored urine, pale colored stools and itchy skin, stomach area (abdominal) pain  - Resistance to HIV Medicines  If you have untreated HIV infection, PAXLOVID may lead to some HIV medicines not working as well in the future  - Other possible side effects include: altered sense of taste, diarrhea, high blood pressure, or muscle aches    These are not all the possible side effects of PAXLOVID  Not many people have taken PAXLOVID  Serious and unexpected side effects may happen  Angélica Mantle is still being studied, so it is possible that all of the risks are not known at this time  What other treatment choices are there?   Like Jorge Poole may allow for the emergency use of other medicines to treat people with COVID-19  Go to https://[employer]/ for information on the emergency use of other medicines that are authorized by FDA to treat people with COVID-19  Your healthcare provider may talk with you about clinical trials for which you may be eligible  It is your choice to be treated or not to be treated with PAXLOVID  Should you decide not to receive it or for your child not to receive it, it will not change your standard medical care  What if I am pregnant or breastfeeding? There is no experience treating pregnant women or breastfeeding mothers with PAXLOVID  For a mother and unborn baby, the benefit of taking PAXLOVID may be greater than the risk from the treatment  If you are pregnant, discuss your options and specific situation with your healthcare provider  It is recommended that you use effective barrier contraception or do not have sexual activity while taking PAXLOVID  If you are breastfeeding, discuss your options and specific situation with your healthcare provider  How do I report side effects with PAXLOVID? Contact your healthcare provider if you have any side effects that bother you or do not go away  Report side effects to FDA MedWatch at www fda gov/medwatch or call 6-539-SMQ3043 or you can report side effects to [facility]  at the contact information provided below  Website Fax number Telephone number   [employer] 5-980.515.6800 [phone]     How should I store Sivan Vicenta? Store PAXLOVID tablets at room temperature between 68°F to 77°F (20°C to 25°C)  Full fact sheet for patients, parents, and caregivers can be found at: [employer]    I have spent 20 minutes directly with the patient   Greater than 50% of this time was spent in counseling/coordination of care regarding: risks and benefits of treatment options, instructions for management, patient and family education and impressions  Encounter provider: Daun Spatz, DO     Provider located at: [facility]  [address] [place] [address],[address] [place] [address] [phone]     Recent Visits  No visits were found meeting these conditions  Showing recent visits within past 7 days and meeting all other requirements  Today's Visits  Date Type Provider Dept   08/25/22 Telemedicine Daun Spatz, DO [facility]   Showing today's visits and meeting all other requirements  Future Appointments  No visits were found meeting these conditions  Showing future appointments within next 150 days and meeting all other requirements     This virtual check-in was done via telephone and he agrees to proceed  Patient agrees to participate in a virtual check in via telephone or video visit instead of presenting to the office to address urgent/immediate medical needs  Patient is aware this is a billable service  He acknowledged consent and understanding of privacy and security of the video platform  The patient has agreed to participate and understands they can discontinue the visit at any time  After connecting through Telephone, the patient was identified by name and date of birth  Emma Spencer was informed that this was a telemedicine visit and that the exam was being conducted confidentially over secure lines  My office door was closed  No one else was in the room  Emma Spencer acknowledged consent and understanding of privacy and security of the telemedicine visit  I informed the patient that I have reviewed his record in Epic and presented the opportunity for him to ask any questions regarding the visit today  The patient agreed to participate      It was my intent to perform this visit via video technology but the patient was not able to do a video connection so the visit was completed via audio telephone only  Verification of patient location:  Patient is located in the following state in which I hold an active license: [place]    Subjective:   Amanda Jackman is a 50 y o  male who has been screened for COVID-19  Patient's symptoms include fever, chills, nasal congestion, rhinorrhea, cough and headache  Patient denies fatigue, malaise, sore throat, anosmia, loss of taste, shortness of breath, chest tightness, abdominal pain, nausea, vomiting, diarrhea and myalgias  - Date of symptom onset: 8/24/2022  - Date of positive COVID-19 test: 8/25/2022  Type of test: Rapid antigen  COVID-19 vaccination status: Fully vaccinated with booster    Mery Ragland has been staying home and has isolated themselves in his home  He is taking care to not share personal items and is cleaning all surfaces that are touched often, like counters, tabletops, and doorknobs using household cleaning sprays or wipes  He is wearing a mask when he leaves his room       Lab Results   Component Value Date    SARSCOVAG Positive (A) 08/25/2022     Past Medical History:   Diagnosis Date    Hernia, abdominal     Hydrocephaly (HCC)     Seizures ([address] )     Spina [facility]ida ([facility] )      Past Surgical History:   Procedure Laterality Date    ABDOMINAL SURGERY      BACK SURGERY      OPEN SPINE AT BIRTH    CSF SHUNT      EYE SURGERY       Current Outpatient Medications   Medication Sig Dispense Refill    acetaminophen (TYLENOL) 500 mg tablet Take 500 mg by mouth every 4 (four) hours as needed      levETIRAcetam (KEPPRA) 500 mg tablet Take 500 mg by mouth 2 (two) times a day      levOCARNitine (CARNITOR) 330 MG tablet Take 330 mg by mouth 2 (two) times a day      loratadine (CLARITIN) 10 mg tablet Take 10 mg by mouth daily      nirmatrelvir & ritonavir (Paxlovid, 300/100,) tablet therapy pack Take 3 tablets by mouth 2 (two) times a day for 5 days Take 2 nirmatrelvir tablets + 1 ritonavir tablet together per dose 30 tablet 0 No current facility-administered medications for this visit  Allergies   Allergen Reactions    Other      Seasonal   Other reaction(s): Unknown Reaction       Review of Systems   Constitutional: Positive for chills and fever  Negative for fatigue  HENT: Positive for congestion and rhinorrhea  Negative for sore throat  Respiratory: Positive for cough  Negative for chest tightness and shortness of breath  Gastrointestinal: Negative for abdominal pain, diarrhea, nausea and vomiting  Musculoskeletal: Negative for myalgias  Neurological: Positive for headaches  Objective:    Vitals:    08/25/22 1036   BP: 107/78   Pulse: 98   Temp: (!) 101 2 °F (38 4 °C)   Weight: 68 kg (150 lb)   Height: 5' 2" (1 575 m)       Physical Exam  Vitals reviewed

## 2022-08-26 NOTE — ASSESSMENT & PLAN NOTE
This is a 61-year-old white male who was evaluated today via a telephone visit  We attempted a video visit but the patient and his father were unable to do this  His father could not understand that he had to on mute his microphone  As such, was converted to a telephone visit  The patient's symptoms were very suspicious for COVID-19  They did not have any test kits at home so I had the patient come to the office  The patient had a COVID-19 rapid antigen test in the office which was positive  I explained to the patient and his parents the diagnosis  This is day #1  The patient is fully vaccinated  He received 1 booster  Because of his underlying medical conditions, he is at risk for progression of disease  He has a candidate for antiviral therapy  The patient and his parents were agreeable  The patient was started on Paxlovid  I also recommended he take vitamin-C, vitamin-D, and zinc   I recommended at least a 5 day quarantine  If he is afebrile for 24 hours without taking any antipyretics, he may break quarantine, providing he can wear mask for 5 additional days any time he is around any other individuals  Otherwise, he will need to quarantine for a full 10 days  I advised the patient to drink plenty of fluids    Call me immediately if he has any shortness of breath

## 2022-10-04 ENCOUNTER — CLINICAL SUPPORT (OUTPATIENT)
Dept: FAMILY MEDICINE CLINIC | Facility: CLINIC | Age: 52
End: 2022-10-04
Payer: MEDICARE

## 2022-10-04 DIAGNOSIS — Z23 ENCOUNTER FOR IMMUNIZATION: Primary | ICD-10-CM

## 2022-10-04 PROCEDURE — 90682 RIV4 VACC RECOMBINANT DNA IM: CPT

## 2022-10-04 PROCEDURE — G0008 ADMIN INFLUENZA VIRUS VAC: HCPCS

## 2022-11-07 ENCOUNTER — OFFICE VISIT (OUTPATIENT)
Dept: FAMILY MEDICINE CLINIC | Facility: CLINIC | Age: 52
End: 2022-11-07

## 2022-11-07 VITALS
OXYGEN SATURATION: 98 % | TEMPERATURE: 97.7 F | DIASTOLIC BLOOD PRESSURE: 82 MMHG | HEIGHT: 62 IN | BODY MASS INDEX: 27.47 KG/M2 | SYSTOLIC BLOOD PRESSURE: 120 MMHG | WEIGHT: 149.3 LBS | HEART RATE: 74 BPM

## 2022-11-07 DIAGNOSIS — Z00.00 ENCOUNTER FOR MEDICARE ANNUAL WELLNESS EXAM: Primary | ICD-10-CM

## 2022-11-07 DIAGNOSIS — Z11.59 ENCOUNTER FOR HEPATITIS C SCREENING TEST FOR LOW RISK PATIENT: ICD-10-CM

## 2022-11-07 DIAGNOSIS — Z12.5 PROSTATE CANCER SCREENING: ICD-10-CM

## 2022-11-07 DIAGNOSIS — Z23 ENCOUNTER FOR IMMUNIZATION: ICD-10-CM

## 2022-11-07 NOTE — PROGRESS NOTES
Assessment and Plan:     Problem List Items Addressed This Visit        Other    Encounter for Medicare annual wellness exam - Primary    Encounter for immunization    Relevant Orders    Pneumococcal Conjugate Vaccine 20-valent (Pcv20) (Completed)    Encounter for hepatitis C screening test for low risk patient    Relevant Orders    Hepatitis C antibody    Prostate cancer screening    Relevant Orders    PSA, Total Screen          Depression Screening and Follow-up Plan: Patient was screened for depression during today's encounter  They screened negative with a PHQ-2 score of 0  Preventive health issues were discussed with patient, and age appropriate screening tests were ordered as noted in patient's After Visit Summary  Personalized health advice and appropriate referrals for health education or preventive services given if needed, as noted in patient's After Visit Summary  History of Present Illness:     Patient presents for a Medicare Wellness Visit    Patient presents to the office today for his initial Medicare wellness exam     Patient Care Team:  Elia Hubbard DO as PCP - General (Family Medicine)     Review of Systems:     Review of Systems   Constitutional: Negative for activity change and appetite change  Respiratory: Negative for cough and shortness of breath  Cardiovascular: Negative for chest pain, palpitations and leg swelling  Gastrointestinal: Negative for abdominal distention, abdominal pain, blood in stool, constipation, diarrhea and nausea  Neurological: Negative for seizures and headaches          Problem List:     Patient Active Problem List   Diagnosis   • Allergic contact dermatitis, unspecified cause   • Acute nasopharyngitis (common cold)   • Allergic rhinitis due to pollen   • 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)   • Seizure disorder (HCC)   • Carnitine deficiency ([facility] [address] )   • Acute left-sided low back pain without sciatica   • Immunization due   • Obstructive hydrocephalus (HCC)   • Dyslipidemia   • Encounter for long-term (current) use of medications   • Adolescent idiopathic scoliosis of thoracolumbar region   • Trigger middle finger   • COVID-19 virus infection   • Viral infection, unspecified   • Encounter for Medicare annual wellness exam   • Encounter for immunization   • Encounter for hepatitis C screening test for low risk patient   • Prostate cancer screening      Past Medical and Surgical History:     Past Medical History:   Diagnosis Date   • Hernia, abdominal    • Hydrocephaly ([address] )    • Seizures ([address] )    • Spina bifida ([address] )      Past Surgical History:   Procedure Laterality Date   • ABDOMINAL SURGERY     • BACK SURGERY      OPEN SPINE AT BIRTH   • CSF SHUNT     • EYE SURGERY        Family History:     Family History   Problem Relation Age of Onset   • No Known Problems Mother    • No Known Problems Father       Social History:     Social History     Socioeconomic History   • Marital status: Single     Spouse name: None   • Number of children: None   • Years of education: None   • Highest education level: None   Occupational History   • None   Tobacco Use   • Smoking status: Never Smoker   • Smokeless tobacco: Never Used   Vaping Use   • Vaping Use: Never used   Substance and Sexual Activity   • Alcohol use: No   • Drug use: No   • Sexual activity: None   Other Topics Concern   • None   Social History Narrative   • None     Social Determinants of Health     Financial Resource Strain: Low Risk    • Difficulty of Paying Living Expenses: Not hard at all   Food Insecurity: Not on file   Transportation Needs: No Transportation Needs   • Lack of Transportation (Medical): No   • Lack of Transportation (Non-Medical):  No   Physical Activity: Not on file   Stress: Not on file   Social Connections: Not on file   Intimate Partner Violence: Not on file   Housing Stability: Not on file      Medications and Allergies:     Current Outpatient Medications Medication Sig Dispense Refill   • acetaminophen (TYLENOL) 500 mg tablet Take 500 mg by mouth every 4 (four) hours as needed     • levETIRAcetam (KEPPRA) 500 mg tablet Take 500 mg by mouth 2 (two) times a day     • levOCARNitine (CARNITOR) 330 MG tablet Take 330 mg by mouth 2 (two) times a day     • loratadine (CLARITIN) 10 mg tablet Take 10 mg by mouth daily       No current facility-administered medications for this visit  Allergies   Allergen Reactions   • Other      Seasonal   Other reaction(s): Unknown Reaction      Immunizations:     Immunization History   Administered Date(s) Administered   • COVID-19 MODERNA VACC 0 25 ML IM BOOSTER 11/15/2021   • COVID-19 MODERNA VACC 0 5 ML IM 03/11/2021, 04/08/2021   • Influenza, recombinant, quadrivalent,injectable, preservative free 10/04/2019, 10/01/2020, 11/08/2021, 10/04/2022   • Pneumococcal Conjugate Vaccine 20-valent (Pcv20), Polysace 11/07/2022   • Tuberculin Skin Test-PPD Intradermal 05/16/2022      Health Maintenance:         Topic Date Due   • HIV Screening  Never done   • Colorectal Cancer Screening  Never done   • Hepatitis C Screening  05/12/2023 (Originally 1970)         Topic Date Due   • COVID-19 Vaccine (4 - Booster for Geovani Rainey series) 06/23/2022      Medicare Screening Tests and Risk Assessments:     Nae Maki is here for his Initial Wellness visit  Health Risk Assessment:   Patient rates overall health as good  Patient feels that their physical health rating is same  Patient is very satisfied with their life  Eyesight was rated as same  Hearing was rated as same  Patient feels that their emotional and mental health rating is same  Patients states they are sometimes angry  Patient states they are sometimes unusually tired/fatigued  Pain experienced in the last 7 days has been none  Patient states that he has experienced no weight loss or gain in last 6 months  Depression Screening:   PHQ-2 Score: 0      Fall Risk Screening:    In the past year, patient has experienced: no history of falling in past year      Home Safety:  Patient does not have trouble with stairs inside or outside of their home  Patient has working smoke alarms and has working carbon monoxide detector  Home safety hazards include: none  Nutrition:   Current diet is Regular  Medications:   Patient is currently taking over-the-counter supplements  OTC medications include: see medication list  Patient is not able to manage medications  Activities of Daily Living (ADLs)/Instrumental Activities of Daily Living (IADLs):   Walk and transfer into and out of bed and chair?: Yes  Dress and groom yourself?: Yes    Bathe or shower yourself?: Yes    Feed yourself? Yes  Do your laundry/housekeeping?: Yes  Manage your money, pay your bills and track your expenses?: No  Make your own meals?: No    Do your own shopping?: No    Previous Hospitalizations:   Any hospitalizations or ED visits within the last 12 months?: No      Advance Care Planning:   Living will: No    Durable POA for healthcare:  Yes    Advanced directive: No      Cognitive Screening:   Provider or family/friend/caregiver concerned regarding cognition?: No    PREVENTIVE SCREENINGS      Cardiovascular Screening:    General: Screening Current      Diabetes Screening:     General: Screening Current      Colorectal Cancer Screening:     General: Risks and Benefits Discussed    Due for: Colonoscopy - Low Risk      Prostate Cancer Screening:    General: Risks and Benefits Discussed    Due for: PSA      Osteoporosis Screening:    General: History Osteoporosis      Abdominal Aortic Aneurysm (AAA) Screening:        General: Screening Not Indicated      Lung Cancer Screening:     General: Screening Not Indicated      Hepatitis C Screening:    General: Risks and Benefits Discussed    Hep C Screening Accepted: Yes      Screening, Brief Intervention, and Referral to Treatment (SBIRT)    Screening  Typical number of drinks in a day: 0  Typical number of drinks in a week: 0  Interpretation: Low risk drinking behavior  AUDIT-C Screenin) How often did you have a drink containing alcohol in the past year? never  2) How many drinks did you have on a typical day when you were drinking in the past year? 0  3) How often did you have 6 or more drinks on one occasion in the past year? never    AUDIT-C Score: 0  Interpretation: Score 0-3 (male): Negative screen for alcohol misuse    Single Item Drug Screening:  How often have you used an illegal drug (including marijuana) or a prescription medication for non-medical reasons in the past year? never    Single Item Drug Screen Score: 0  Interpretation: Negative screen for possible drug use disorder    No exam data present     Physical Exam:     /82   Pulse 74   Temp 97 7 °F (36 5 °C) (Tympanic)   Ht 5' 2" (1 575 m)   Wt 67 7 kg (149 lb 4 8 oz)   SpO2 98%   BMI 27 31 kg/m²     Physical Exam  Vitals reviewed  Constitutional:       Comments: This is a 16-year-old white male who appears his stated age  He is pleasant, cooperative, and in no distress  HENT:      Head: Normocephalic and atraumatic  Right Ear: Tympanic membrane, ear canal and external ear normal  There is no impacted cerumen  Left Ear: Tympanic membrane, ear canal and external ear normal  There is no impacted cerumen  Mouth/Throat:      Mouth: Mucous membranes are moist       Pharynx: Oropharynx is clear  No oropharyngeal exudate or posterior oropharyngeal erythema  Eyes:      General: No scleral icterus  Right eye: No discharge  Left eye: No discharge  Conjunctiva/sclera: Conjunctivae normal       Pupils: Pupils are equal, round, and reactive to light  Neck:      Comments: No thyromegaly noted  Cardiovascular:      Rate and Rhythm: Normal rate and regular rhythm  Heart sounds: Normal heart sounds  No murmur heard  No friction rub  No gallop     Pulmonary:      Effort: Pulmonary effort is normal  No respiratory distress  Breath sounds: Normal breath sounds  No stridor  No wheezing, rhonchi or rales  Abdominal:      General: Bowel sounds are normal  There is no distension  Palpations: Abdomen is soft  There is no mass  Tenderness: There is no abdominal tenderness  There is no guarding  Comments: No organomegaly   Musculoskeletal:      Cervical back: Neck supple  Lymphadenopathy:      Cervical: No cervical adenopathy  Neurological:      General: No focal deficit present  Mental Status: He is oriented to person, place, and time  Psychiatric:         Mood and Affect: Mood normal          Behavior: Behavior normal          Thought Content:  Thought content normal          Judgment: Judgment normal      Extremities:  Without cyanosis, clubbing, or edema        Anne Mahopac, DO

## 2022-11-07 NOTE — PATIENT INSTRUCTIONS
Medicare Preventive Visit Patient Instructions  Thank you for completing your Welcome to Medicare Visit or Medicare Annual Wellness Visit today  Your next wellness visit will be due in one year (11/8/2023)  The screening/preventive services that you may require over the next 5-10 years are detailed below  Some tests may not apply to you based off risk factors and/or age  Screening tests ordered at today's visit but not completed yet may show as past due  Also, please note that scanned in results may not display below  Preventive Screenings:  Service Recommendations Previous Testing/Comments   Colorectal Cancer Screening  · Colonoscopy    · Fecal Occult Blood Test (FOBT)/Fecal Immunochemical Test (FIT)  · Fecal DNA/Cologuard Test  · Flexible Sigmoidoscopy Age: 39-70 years old   Colonoscopy: every 10 years (May be performed more frequently if at higher risk)  OR  FOBT/FIT: every 1 year  OR  Cologuard: every 3 years  OR  Sigmoidoscopy: every 5 years  Screening may be recommended earlier than age 39 if at higher risk for colorectal cancer  Also, an individualized decision between you and your healthcare provider will decide whether screening between the ages of 74-80 would be appropriate   Colonoscopy: Not on file  FOBT/FIT: Not on file  Cologuard: Not on file  Sigmoidoscopy: Not on file          Prostate Cancer Screening Individualized decision between patient and health care provider in men between ages of 53-78   Medicare will cover every 12 months beginning on the day after your 50th birthday PSA: No results in last 5 years     Screening Not Indicated     Hepatitis C Screening Once for adults born between 1945 and 1965  More frequently in patients at high risk for Hepatitis C Hep C Antibody: Not on file    Screening Current   Diabetes Screening 1-2 times per year if you're at risk for diabetes or have pre-diabetes Fasting glucose: 90 mg/dL (5/16/2022)  A1C: 5 5 % (1/5/2019)  Screening Current   Cholesterol Screening Once every 5 years if you don't have a lipid disorder  May order more often based on risk factors  Lipid panel: 05/16/2022  Screening Current      Other Preventive Screenings Covered by Medicare:  1  Abdominal Aortic Aneurysm (AAA) Screening: covered once if your at risk  You're considered to be at risk if you have a family history of AAA or a male between the age of 73-68 who smoking at least 100 cigarettes in your lifetime  2  Lung Cancer Screening: covers low dose CT scan once per year if you meet all of the following conditions: (1) Age 50-69; (2) No signs or symptoms of lung cancer; (3) Current smoker or have quit smoking within the last 15 years; (4) You have a tobacco smoking history of at least 20 pack years (packs per day x number of years you smoked); (5) You get a written order from a healthcare provider  3  Glaucoma Screening: covered annually if you're considered high risk: (1) You have diabetes OR (2) Family history of glaucoma OR (3)  aged 48 and older OR (3)  American aged 72 and older  3  Osteoporosis Screening: covered every 2 years if you meet one of the following conditions: (1) Have a vertebral abnormality; (2) On glucocorticoid therapy for more than 3 months; (3) Have primary hyperparathyroidism; (4) On osteoporosis medications and need to assess response to drug therapy  5  HIV Screening: covered annually if you're between the age of 12-76  Also covered annually if you are younger than 13 and older than 72 with risk factors for HIV infection  For pregnant patients, it is covered up to 3 times per pregnancy      Immunizations:  Immunization Recommendations   Influenza Vaccine Annual influenza vaccination during flu season is recommended for all persons aged >= 6 months who do not have contraindications   Pneumococcal Vaccine   * Pneumococcal conjugate vaccine = PCV13 (Prevnar 13), PCV15 (Vaxneuvance), PCV20 (Prevnar 20)  * Pneumococcal polysaccharide vaccine = PPSV23 (Pneumovax) Adults 2364 years old: 1-3 doses may be recommended based on certain risk factors  Adults 72 years old: 1-2 doses may be recommended based off what pneumonia vaccine you previously received   Hepatitis B Vaccine 3 dose series if at intermediate or high risk (ex: diabetes, end stage renal disease, liver disease)   Tetanus (Td) Vaccine - COST NOT COVERED BY MEDICARE PART B Following completion of primary series, a booster dose should be given every 10 years to maintain immunity against tetanus  Td may also be given as tetanus wound prophylaxis  Tdap Vaccine - COST NOT COVERED BY MEDICARE PART B Recommended at least once for all adults  For pregnant patients, recommended with each pregnancy  Shingles Vaccine (Shingrix) - COST NOT COVERED BY MEDICARE PART B  2 shot series recommended in those aged 48 and above     Health Maintenance Due:      Topic Date Due   • HIV Screening  Never done   • Colorectal Cancer Screening  Never done   • Hepatitis C Screening  05/12/2023 (Originally 1970)     Immunizations Due:      Topic Date Due   • COVID-19 Vaccine (4 - Booster for Moderna series) 06/23/2022     Advance Directives   What are advance directives? Advance directives are legal documents that state your wishes and plans for medical care  These plans are made ahead of time in case you lose your ability to make decisions for yourself  Advance directives can apply to any medical decision, such as the treatments you want, and if you want to donate organs  What are the types of advance directives? There are many types of advance directives, and each state has rules about how to use them  You may choose a combination of any of the following:  · Living will: This is a written record of the treatment you want  You can also choose which treatments you do not want, which to limit, and which to stop at a certain time  This includes surgery, medicine, IV fluid, and tube feedings     · Durable power of  for [facility]): This is a written record that states who you want to make healthcare choices for you when you are unable to make them for yourself  This person, called a proxy, is usually a family member or a friend  You may choose more than 1 proxy  · Do not resuscitate (DNR) order:  A DNR order is used in case your heart stops beating or you stop breathing  It is a request not to have certain forms of treatment, such as CPR  A DNR order may be included in other types of advance directives  · Medical directive: This covers the care that you want if you are in a coma, near death, or unable to make decisions for yourself  You can list the treatments you want for each condition  Treatment may include pain medicine, surgery, blood transfusions, dialysis, IV or tube feedings, and a ventilator (breathing machine)  · Values history: This document has questions about your views, beliefs, and how you feel and think about life  This information can help others choose the care that you would choose  Why are advance directives important? An advance directive helps you control your care  Although spoken wishes may be used, it is better to have your wishes written down  Spoken wishes can be misunderstood, or not followed  Treatments may be given even if you do not want them  An advance directive may make it easier for your family to make difficult choices about your care  Weight Management   Why it is important to manage your weight:  Being overweight increases your risk of health conditions such as heart disease, high blood pressure, type 2 diabetes, and certain types of cancer  It can also increase your risk for osteoarthritis, sleep apnea, and other respiratory problems  Aim for a slow, steady weight loss  Even a small amount of weight loss can lower your risk of health problems  How to lose weight safely:  A safe and healthy way to lose weight is to eat fewer calories and get regular exercise   You can lose up about 1 pound a week by decreasing the number of calories you eat by 500 calories each day  Healthy meal plan for weight management:  A healthy meal plan includes a variety of foods, contains fewer calories, and helps you stay healthy  A healthy meal plan includes the following:  · Eat whole-grain foods more often  A healthy meal plan should contain fiber  Fiber is the part of grains, fruits, and vegetables that is not broken down by your body  Whole-grain foods are healthy and provide extra fiber in your diet  Some examples of whole-grain foods are whole-wheat breads and pastas, oatmeal, brown rice, and bulgur  · Eat a variety of vegetables every day  Include dark, leafy greens such as spinach, kale, lilliam greens, and mustard greens  Eat yellow and orange vegetables such as carrots, sweet potatoes, and winter squash  · Eat a variety of fruits every day  Choose fresh or canned fruit (canned in its own juice or light syrup) instead of juice  Fruit juice has very little or no fiber  · Eat low-fat dairy foods  Drink fat-free (skim) milk or 1% milk  Eat fat-free yogurt and low-fat cottage cheese  Try low-fat cheeses such as mozzarella and other reduced-fat cheeses  · Choose meat and other protein foods that are low in fat  Choose beans or other legumes such as split peas or lentils  Choose fish, skinless poultry (chicken or turkey), or lean cuts of red meat (beef or pork)  Before you cook meat or poultry, cut off any visible fat  · Use less fat and oil  Try baking foods instead of frying them  Add less fat, such as margarine, sour cream, regular salad dressing and mayonnaise to foods  Eat fewer high-fat foods  Some examples of high-fat foods include french fries, doughnuts, ice cream, and cakes  · Eat fewer sweets  Limit foods and drinks that are high in sugar  This includes candy, cookies, regular soda, and sweetened drinks    Exercise:  Exercise at least 30 minutes per day on most days of the week  Some examples of exercise include walking, biking, dancing, and swimming  You can also fit in more physical activity by taking the stairs instead of the elevator or parking farther away from stores  Ask your healthcare provider about the best exercise plan for you  © Copyright [employer] 2018 Information is for End User's use only and may not be sold, redistributed or otherwise used for commercial purposes   All illustrations and images included in CareNotes® are the copyrighted property of [employer]  or [address] [place] [address] [employer]

## 2023-01-06 PROBLEM — Z12.5 PROSTATE CANCER SCREENING: Status: RESOLVED | Noted: 2022-11-07 | Resolved: 2023-01-06

## 2023-01-06 PROBLEM — Z00.00 ENCOUNTER FOR MEDICARE ANNUAL WELLNESS EXAM: Status: RESOLVED | Noted: 2022-11-07 | Resolved: 2023-01-06

## 2023-03-03 ENCOUNTER — APPOINTMENT (OUTPATIENT)
Dept: LAB | Facility: CLINIC | Age: 53
End: 2023-03-03

## 2023-03-03 DIAGNOSIS — Z11.59 ENCOUNTER FOR HEPATITIS C SCREENING TEST FOR LOW RISK PATIENT: ICD-10-CM

## 2023-03-03 DIAGNOSIS — Z12.5 PROSTATE CANCER SCREENING: ICD-10-CM

## 2023-03-04 LAB
HCV AB SER QL: NORMAL
PSA SERPL-MCNC: 1.2 NG/ML (ref 0–4)

## 2023-05-09 ENCOUNTER — OFFICE VISIT (OUTPATIENT)
Dept: FAMILY MEDICINE CLINIC | Facility: CLINIC | Age: 53
End: 2023-05-09

## 2023-05-09 VITALS
DIASTOLIC BLOOD PRESSURE: 70 MMHG | OXYGEN SATURATION: 100 % | HEIGHT: 62 IN | WEIGHT: 149 LBS | HEART RATE: 75 BPM | SYSTOLIC BLOOD PRESSURE: 119 MMHG | TEMPERATURE: 96.2 F | BODY MASS INDEX: 27.42 KG/M2

## 2023-05-09 DIAGNOSIS — E78.5 DYSLIPIDEMIA: ICD-10-CM

## 2023-05-09 DIAGNOSIS — Q03.0 HYDROCEPHALUS ASSOCIATED WITH CONGENITAL AQUEDUCT STENOSIS (HCC): ICD-10-CM

## 2023-05-09 DIAGNOSIS — E71.40 CARNITINE DEFICIENCY (HCC): ICD-10-CM

## 2023-05-09 DIAGNOSIS — G40.909 SEIZURE DISORDER (HCC): Primary | ICD-10-CM

## 2023-05-09 DIAGNOSIS — Z79.899 ENCOUNTER FOR LONG-TERM (CURRENT) USE OF MEDICATIONS: ICD-10-CM

## 2023-05-09 DIAGNOSIS — J30.1 SEASONAL ALLERGIC RHINITIS DUE TO POLLEN: ICD-10-CM

## 2023-05-09 DIAGNOSIS — G91.1 OBSTRUCTIVE HYDROCEPHALUS (HCC): ICD-10-CM

## 2023-05-09 RX ORDER — LEVOCARNITINE 330 MG/1
TABLET ORAL
Qty: 180 TABLET | Refills: 2 | Status: SHIPPED | OUTPATIENT
Start: 2023-05-09 | End: 2023-05-11 | Stop reason: SDUPTHER

## 2023-05-09 RX ORDER — LEVOCARNITINE 330 MG/1
330 TABLET ORAL 2 TIMES DAILY
Qty: 180 TABLET | Refills: 2 | Status: SHIPPED | OUTPATIENT
Start: 2023-05-09 | End: 2023-05-09 | Stop reason: SDUPTHER

## 2023-05-09 RX ORDER — LEVETIRACETAM 500 MG/1
500 TABLET ORAL 2 TIMES DAILY
Qty: 180 TABLET | Refills: 2 | Status: SHIPPED | OUTPATIENT
Start: 2023-05-09

## 2023-05-09 NOTE — PROGRESS NOTES
Name: Lina Yang      : 1970      MRN: [id number]  Encounter Provider: Mary Madrigal DO  Encounter Date: 2023   Encounter department: [facility]    Assessment & Plan     1  Seizure disorder [facility])  Assessment & Plan:  Seizure disorder is currently stable  I ordered a CBC with differential, CMP, and a Keppra level  I refilled his prescription for Keppra 500 mg twice daily  Orders:  -     levETIRAcetam (KEPPRA) 500 mg tablet; Take 1 tablet (500 mg total) by mouth 2 (two) times a day  -     Levetiracetam level; Future    2  Encounter for long-term (current) use of medications  -     CBC and differential; Future  -     Levetiracetam level; Future    3  Dyslipidemia  -     Comprehensive metabolic panel; Future  -     Lipid panel; Future    4  Carnitine deficiency ([facility] [address] )  Assessment & Plan:  Refilled the patient's prescription for levocarnitine 330 mg  He will take 1 every morning and 2 every evening  5  Hydrocephalus associated with congenital aqueduct stenosis [facility])  Assessment & Plan:  Patient has no dizziness or headaches or any worrisome symptoms  6  Obstructive hydrocephalus ([address] )    7  Seasonal allergic rhinitis due to pollen  Assessment & Plan:  Patient has seasonal allergic rhinitis  Symptoms are currently controlled on Claritin 10 mg daily           Subjective      This is a 70-year-old white male who presents to the office today for his routine checkup  The patient is accompanied to the office today by his father  Patient reports she is doing well  He has no complaints  He is relatively inactive  He no longer goes to the training center  He has been compliant with use of his medication  His allergies have been controlled this spring  He has not suffered any seizures  Review of Systems   HENT: Negative for congestion and rhinorrhea  Cardiovascular: Negative for chest pain, palpitations and leg swelling     Gastrointestinal: Negative for abdominal "distention, abdominal pain, blood in stool, constipation, diarrhea and nausea  Genitourinary:        Denies weak urinary stream, denies nocturia   Neurological: Negative for dizziness, seizures and headaches  Current Outpatient Medications on File Prior to Visit   Medication Sig   • acetaminophen (TYLENOL) 500 mg tablet Take 500 mg by mouth every 4 (four) hours as needed   • loratadine (CLARITIN) 10 mg tablet Take 10 mg by mouth daily   • [DISCONTINUED] levETIRAcetam (KEPPRA) 500 mg tablet Take 500 mg by mouth 2 (two) times a day   • [DISCONTINUED] levOCARNitine (CARNITOR) 330 MG tablet Take 330 mg by mouth 2 (two) times a day       Objective     /70   Pulse 75   Temp (!) 96 2 °F (35 7 °C) (Tympanic)   Ht 5' 2\" (1 575 m)   Wt 67 6 kg (149 lb)   SpO2 100%   BMI 27 25 kg/m²     Physical Exam  Vitals reviewed  Constitutional:       Comments: Patient is a 53-year-old white male who appears his stated age  Patient is pleasant, cooperative, and in no distress   HENT:      Head: Normocephalic and atraumatic  Right Ear: Tympanic membrane, ear canal and external ear normal  There is no impacted cerumen  Left Ear: Tympanic membrane, ear canal and external ear normal  There is no impacted cerumen  Mouth/Throat:      Mouth: Mucous membranes are moist       Pharynx: Oropharynx is clear  No oropharyngeal exudate or posterior oropharyngeal erythema  Eyes:      General: No scleral icterus  Right eye: No discharge  Left eye: No discharge  Conjunctiva/sclera: Conjunctivae normal       Pupils: Pupils are equal, round, and reactive to light  Cardiovascular:      Rate and Rhythm: Normal rate and regular rhythm  Heart sounds: Normal heart sounds  No murmur heard  No friction rub  No gallop  Pulmonary:      Effort: Pulmonary effort is normal  No respiratory distress  Breath sounds: Normal breath sounds  No stridor  No wheezing, rhonchi or rales     Abdominal:      " General: Bowel sounds are normal  There is no distension  Palpations: Abdomen is soft  There is no mass  Tenderness: There is no abdominal tenderness  There is no guarding  Musculoskeletal:      Cervical back: Neck supple  No tenderness  Lymphadenopathy:      Cervical: No cervical adenopathy  Psychiatric:         Mood and Affect: Mood normal          Behavior: Behavior normal          Thought Content:  Thought content normal          Judgment: Judgment normal        Extremities: Without cyanosis, clubbing, or edema  Linda Valdes DO

## 2023-05-10 NOTE — ASSESSMENT & PLAN NOTE
Seizure disorder is currently stable  I ordered a CBC with differential, CMP, and a Keppra level  I refilled his prescription for Keppra 500 mg twice daily

## 2023-05-10 NOTE — ASSESSMENT & PLAN NOTE
Refilled the patient's prescription for levocarnitine 330 mg  He will take 1 every morning and 2 every evening

## 2023-05-11 DIAGNOSIS — E71.40 CARNITINE DEFICIENCY (HCC): ICD-10-CM

## 2023-05-11 RX ORDER — LEVOCARNITINE 330 MG/1
TABLET ORAL
Qty: 180 TABLET | Refills: 2 | Status: SHIPPED | OUTPATIENT
Start: 2023-05-11

## 2023-11-08 ENCOUNTER — APPOINTMENT (OUTPATIENT)
Dept: LAB | Facility: CLINIC | Age: 53
End: 2023-11-08
Payer: MEDICARE

## 2023-11-08 DIAGNOSIS — Z79.899 ENCOUNTER FOR LONG-TERM (CURRENT) USE OF MEDICATIONS: ICD-10-CM

## 2023-11-08 DIAGNOSIS — E78.5 DYSLIPIDEMIA: ICD-10-CM

## 2023-11-08 DIAGNOSIS — G40.909 SEIZURE DISORDER (HCC): ICD-10-CM

## 2023-11-08 LAB
ALBUMIN SERPL BCP-MCNC: 4.4 G/DL (ref 3.5–5)
ALP SERPL-CCNC: 58 U/L (ref 34–104)
ALT SERPL W P-5'-P-CCNC: 13 U/L (ref 7–52)
ANION GAP SERPL CALCULATED.3IONS-SCNC: 3 MMOL/L
AST SERPL W P-5'-P-CCNC: 16 U/L (ref 13–39)
BASOPHILS # BLD AUTO: 0.02 THOUSANDS/ÂΜL (ref 0–0.1)
BASOPHILS NFR BLD AUTO: 0 % (ref 0–1)
BILIRUB SERPL-MCNC: 0.74 MG/DL (ref 0.2–1)
BUN SERPL-MCNC: 15 MG/DL (ref 5–25)
CALCIUM SERPL-MCNC: 9.4 MG/DL (ref 8.4–10.2)
CHLORIDE SERPL-SCNC: 109 MMOL/L (ref 96–108)
CHOLEST SERPL-MCNC: 157 MG/DL
CO2 SERPL-SCNC: 32 MMOL/L (ref 21–32)
CREAT SERPL-MCNC: 1.01 MG/DL (ref 0.6–1.3)
EOSINOPHIL # BLD AUTO: 0.07 THOUSAND/ÂΜL (ref 0–0.61)
EOSINOPHIL NFR BLD AUTO: 1 % (ref 0–6)
ERYTHROCYTE [DISTWIDTH] IN BLOOD BY AUTOMATED COUNT: 12 % (ref 11.6–15.1)
GFR SERPL CREATININE-BSD FRML MDRD: 85 ML/MIN/1.73SQ M
GLUCOSE P FAST SERPL-MCNC: 90 MG/DL (ref 65–99)
HCT VFR BLD AUTO: 42.2 % (ref 36.5–49.3)
HDLC SERPL-MCNC: 49 MG/DL
HGB BLD-MCNC: 14.2 G/DL (ref 12–17)
IMM GRANULOCYTES # BLD AUTO: 0.02 THOUSAND/UL (ref 0–0.2)
IMM GRANULOCYTES NFR BLD AUTO: 0 % (ref 0–2)
LDLC SERPL CALC-MCNC: 82 MG/DL (ref 0–100)
LYMPHOCYTES # BLD AUTO: 1.53 THOUSANDS/ÂΜL (ref 0.6–4.47)
LYMPHOCYTES NFR BLD AUTO: 25 % (ref 14–44)
MCH RBC QN AUTO: 31.2 PG (ref 26.8–34.3)
MCHC RBC AUTO-ENTMCNC: 33.6 G/DL (ref 31.4–37.4)
MCV RBC AUTO: 93 FL (ref 82–98)
MONOCYTES # BLD AUTO: 0.33 THOUSAND/ÂΜL (ref 0.17–1.22)
MONOCYTES NFR BLD AUTO: 5 % (ref 4–12)
NEUTROPHILS # BLD AUTO: 4.15 THOUSANDS/ÂΜL (ref 1.85–7.62)
NEUTS SEG NFR BLD AUTO: 69 % (ref 43–75)
NONHDLC SERPL-MCNC: 108 MG/DL
NRBC BLD AUTO-RTO: 0 /100 WBCS
PLATELET # BLD AUTO: 198 THOUSANDS/UL (ref 149–390)
PMV BLD AUTO: 11.1 FL (ref 8.9–12.7)
POTASSIUM SERPL-SCNC: 4.2 MMOL/L (ref 3.5–5.3)
PROT SERPL-MCNC: 6.9 G/DL (ref 6.4–8.4)
RBC # BLD AUTO: 4.55 MILLION/UL (ref 3.88–5.62)
SODIUM SERPL-SCNC: 144 MMOL/L (ref 135–147)
TRIGL SERPL-MCNC: 132 MG/DL
WBC # BLD AUTO: 6.12 THOUSAND/UL (ref 4.31–10.16)

## 2023-11-08 PROCEDURE — 80053 COMPREHEN METABOLIC PANEL: CPT

## 2023-11-08 PROCEDURE — 85025 COMPLETE CBC W/AUTO DIFF WBC: CPT

## 2023-11-08 PROCEDURE — 80061 LIPID PANEL: CPT

## 2023-11-08 PROCEDURE — 80177 DRUG SCRN QUAN LEVETIRACETAM: CPT

## 2023-11-08 PROCEDURE — 36415 COLL VENOUS BLD VENIPUNCTURE: CPT

## 2023-11-10 LAB — LEVETIRACETAM SERPL-MCNC: 9.8 UG/ML (ref 10–40)

## 2023-11-14 ENCOUNTER — OFFICE VISIT (OUTPATIENT)
Dept: FAMILY MEDICINE CLINIC | Facility: CLINIC | Age: 53
End: 2023-11-14
Payer: MEDICARE

## 2023-11-14 VITALS
SYSTOLIC BLOOD PRESSURE: 121 MMHG | BODY MASS INDEX: 27.38 KG/M2 | OXYGEN SATURATION: 100 % | TEMPERATURE: 97.1 F | HEART RATE: 59 BPM | HEIGHT: 62 IN | WEIGHT: 148.8 LBS | DIASTOLIC BLOOD PRESSURE: 75 MMHG

## 2023-11-14 DIAGNOSIS — E78.5 DYSLIPIDEMIA: ICD-10-CM

## 2023-11-14 DIAGNOSIS — Z00.00 MEDICARE ANNUAL WELLNESS VISIT, SUBSEQUENT: ICD-10-CM

## 2023-11-14 DIAGNOSIS — G40.909 SEIZURE DISORDER (HCC): Primary | ICD-10-CM

## 2023-11-14 DIAGNOSIS — E71.40 CARNITINE DEFICIENCY (HCC): ICD-10-CM

## 2023-11-14 DIAGNOSIS — Z23 ENCOUNTER FOR IMMUNIZATION: ICD-10-CM

## 2023-11-14 PROCEDURE — 90686 IIV4 VACC NO PRSV 0.5 ML IM: CPT

## 2023-11-14 PROCEDURE — G0008 ADMIN INFLUENZA VIRUS VAC: HCPCS

## 2023-11-14 PROCEDURE — G0439 PPPS, SUBSEQ VISIT: HCPCS | Performed by: FAMILY MEDICINE

## 2023-11-14 PROCEDURE — 99214 OFFICE O/P EST MOD 30 MIN: CPT | Performed by: FAMILY MEDICINE

## 2023-11-14 RX ORDER — LEVOCARNITINE 330 MG/1
TABLET ORAL
Qty: 180 TABLET | Refills: 2 | Status: SHIPPED | OUTPATIENT
Start: 2023-11-14

## 2023-11-14 RX ORDER — LEVETIRACETAM 500 MG/1
500 TABLET ORAL 2 TIMES DAILY
Qty: 180 TABLET | Refills: 2 | Status: SHIPPED | OUTPATIENT
Start: 2023-11-14

## 2023-11-14 NOTE — PROGRESS NOTES
Assessment and Plan:     Problem List Items Addressed This Visit          Nervous and Auditory    Seizure disorder [facility]) - Primary     Patient has seizure disorder. I reviewed his labs. His most recent Keppra level was just below 10. He remains asymptomatic so going to continue him on his current regiment. I refilled his Keppra dose of 500 mg twice a day         Relevant Medications    levETIRAcetam (KEPPRA) 500 mg tablet       Other    Carnitine deficiency (HCC)     I refilled the patient's levocarnitine 330 mg. He will continue 1 every morning and 2 every evening. Relevant Medications    levOCARNitine (CARNITOR) 330 MG tablet    Dyslipidemia     I reviewed the patient's CBC with differential, CMP, and lipid panel which were all unremarkable. Encounter for immunization    Relevant Orders    influenza vaccine, quadrivalent, 0.5 mL, preservative-free, for adult and pediatric patients 6 mos+ (AFLURIA, FLUARIX, FLULAVAL, FLUZONE) (Completed)    Medicare annual wellness visit, subsequent       Depression Screening and Follow-up Plan: Patient was screened for depression during today's encounter. They screened negative with a PHQ-2 score of 0. Preventive health issues were discussed with patient, and age appropriate screening tests were ordered as noted in patient's After Visit Summary. Personalized health advice and appropriate referrals for health education or preventive services given if needed, as noted in patient's After Visit Summary. History of Present Illness:     Patient presents for a Medicare Wellness Visit    Patient is a 53-year-old white male who presents to the office today for his routine checkup as well as for his annual Medicare wellness exam.  He requests refills on his Keppra and his levocarnitine. He has not had any seizures. He has been doing well. He has not worked since the start of the pandemic. He does not exercise and has been relatively inactive. Patient Care Team:  Bora Cano DO as PCP - General (Family Medicine)     Review of Systems:     Review of Systems   Constitutional:  Negative for activity change and appetite change. Cardiovascular:  Negative for chest pain, palpitations and leg swelling. Gastrointestinal:  Negative for abdominal distention, abdominal pain, blood in stool, constipation, diarrhea and nausea. Genitourinary:         Patient denies nocturia, weak urinary stream   Neurological:  Negative for seizures and headaches.         Problem List:     Patient Active Problem List   Diagnosis    Allergic contact dermatitis, unspecified cause    Acute nasopharyngitis (common cold)    Allergic rhinitis due to pollen    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)    Seizure disorder (HCC)    Carnitine deficiency ([address])    Acute left-sided low back pain without sciatica    Immunization due    Obstructive hydrocephalus ([address])    Dyslipidemia    Encounter for long-term (current) use of medications    Adolescent idiopathic scoliosis of thoracolumbar region    Trigger middle finger    COVID-19 virus infection    Viral infection, unspecified    Encounter for immunization    Encounter for hepatitis C screening test for low risk patient    Medicare annual wellness visit, subsequent      Past Medical and Surgical History:     Past Medical History:   Diagnosis Date    Hernia, abdominal     Hydrocephaly ([address])     Seizures ([address])     Spina bifida ([address])      Past Surgical History:   Procedure Laterality Date    ABDOMINAL SURGERY      BACK SURGERY      OPEN SPINE AT BIRTH    CSF SHUNT      EYE SURGERY        Family History:     Family History   Problem Relation Age of Onset    No Known Problems Mother     No Known Problems Father       Social History:     Social History     Socioeconomic History    Marital status: Single     Spouse name: None    Number of children: None    Years of education: None    Highest education level: None Occupational History    None   Tobacco Use    Smoking status: Never    Smokeless tobacco: Never   Vaping Use    Vaping Use: Never used   Substance and Sexual Activity    Alcohol use: No    Drug use: No    Sexual activity: Not Currently   Other Topics Concern    None   Social History Narrative    None     Social Determinants of Health     Financial Resource Strain: Low Risk  (11/14/2023)    Overall Financial Resource Strain (CARDIA)     Difficulty of Paying Living Expenses: Not hard at all   Food Insecurity: Not on file   Transportation Needs: No Transportation Needs (11/14/2023)    PRAPARE - Transportation     Lack of Transportation (Medical): No     Lack of Transportation (Non-Medical): No   Physical Activity: Not on file   Stress: Not on file   Social Connections: Not on file   Intimate Partner Violence: Not on file   Housing Stability: Not on file      Medications and Allergies:     Current Outpatient Medications   Medication Sig Dispense Refill    acetaminophen (TYLENOL) 500 mg tablet Take 500 mg by mouth every 4 (four) hours as needed      levETIRAcetam (KEPPRA) 500 mg tablet Take 1 tablet (500 mg total) by mouth 2 (two) times a day 180 tablet 2    levOCARNitine (CARNITOR) 330 MG tablet Take 1 tablet in the morning and 2 tablets in the evening 180 tablet 2    loratadine (CLARITIN) 10 mg tablet Take 10 mg by mouth daily       No current facility-administered medications for this visit.      Allergies   Allergen Reactions    Other      Seasonal   Other reaction(s): Unknown Reaction      Immunizations:     Immunization History   Administered Date(s) Administered    COVID-19 MODERNA VACC 0.25 ML IM BOOSTER 11/15/2021    COVID-19 MODERNA VACC 0.5 ML IM 03/11/2021, 04/08/2021    Influenza, injectable, quadrivalent, preservative free 0.5 mL 11/14/2023    Influenza, recombinant, quadrivalent,injectable, preservative free 10/04/2019, 10/01/2020, 11/08/2021, 10/04/2022    Pneumococcal Conjugate Vaccine 20-valent (Pcv20), Polysace 11/07/2022    Tuberculin Skin Test-PPD Intradermal 05/16/2022      Health Maintenance:         Topic Date Due    HIV Screening  Never done    Colorectal Cancer Screening  04/20/2026    Hepatitis C Screening  Completed         Topic Date Due    COVID-19 Vaccine (4 - Ferny Lav series) 01/10/2022      Medicare Screening Tests and Risk Assessments:     Gloria Gill is here for his Subsequent Wellness visit. Last Medicare Wellness visit information reviewed, patient interviewed and updates made to the record today. Health Risk Assessment:   Patient rates overall health as good. Patient feels that their physical health rating is same. Patient is satisfied with their life. Eyesight was rated as same. Hearing was rated as same. Patient feels that their emotional and mental health rating is same. Patients states they are never, rarely angry. Patient states they are never, rarely unusually tired/fatigued. Pain experienced in the last 7 days has been none. Patient states that he has experienced no weight loss or gain in last 6 months. Depression Screening:   PHQ-2 Score: 0      Fall Risk Screening: In the past year, patient has experienced: no history of falling in past year      Home Safety:  Patient does not have trouble with stairs inside or outside of their home. Patient has working smoke alarms and has working carbon monoxide detector. Home safety hazards include: none. Nutrition:   Current diet is Regular. Medications:   Patient is not currently taking any over-the-counter supplements. Patient is not able to manage medications. Can take from bottles but doesn't call to prescriptions refilled      Activities of Daily Living (ADLs)/Instrumental Activities of Daily Living (IADLs):   Walk and transfer into and out of bed and chair?: Yes  Dress and groom yourself?: Yes    Bathe or shower yourself?: Yes    Feed yourself?  Yes  Do your laundry/housekeeping?: Yes  Manage your money, pay your bills and track your expenses?: Yes  Make your own meals?: No    Do your own shopping?: No    Previous Hospitalizations:   Any hospitalizations or ED visits within the last 12 months?: No      Advance Care Planning:   Living will: No    Durable POA for healthcare: No      Cognitive Screening:   Provider or family/friend/caregiver concerned regarding cognition?: No    PREVENTIVE SCREENINGS      Cardiovascular Screening:    General: Screening Current      Diabetes Screening:     General: Screening Current      Colorectal Cancer Screening:     General: Screening Current      Prostate Cancer Screening:    General: Screening Current      Osteoporosis Screening:    General: Screening Not Indicated      Abdominal Aortic Aneurysm (AAA) Screening:        General: Screening Not Indicated      Lung Cancer Screening:     General: Screening Not Indicated      Hepatitis C Screening:    General: Screening Current    Screening, Brief Intervention, and Referral to Treatment (SBIRT)    Screening  Typical number of drinks in a day: 0  Typical number of drinks in a week: 0  Interpretation: Low risk drinking behavior. AUDIT-C Screenin) How often did you have a drink containing alcohol in the past year? never  2) How many drinks did you have on a typical day when you were drinking in the past year? 0  3) How often did you have 6 or more drinks on one occasion in the past year? never    AUDIT-C Score: 0  Interpretation: Score 0-3 (male): Negative screen for alcohol misuse    Single Item Drug Screening:  How often have you used an illegal drug (including marijuana) or a prescription medication for non-medical reasons in the past year? never    Single Item Drug Screen Score: 0  Interpretation: Negative screen for possible drug use disorder    No results found.      Physical Exam:     /75   Pulse 59   Temp (!) 97.1 °F (36.2 °C) (Tympanic)   Ht 5' 2" (1.575 m)   Wt 67.5 kg (148 lb 12.8 oz)   SpO2 100%   BMI 27.22 kg/m² Physical Exam  Vitals reviewed. Constitutional:       Comments: This is a 65-year-old white male who appears his stated age. He is pleasant, cooperative, and in no distress   HENT:      Head: Normocephalic and atraumatic. Right Ear: Tympanic membrane, ear canal and external ear normal. There is no impacted cerumen. Left Ear: Tympanic membrane, ear canal and external ear normal. There is no impacted cerumen. Mouth/Throat:      Mouth: Mucous membranes are moist.      Pharynx: Oropharynx is clear. No oropharyngeal exudate or posterior oropharyngeal erythema. Eyes:      General: No scleral icterus. Right eye: No discharge. Left eye: No discharge. Conjunctiva/sclera: Conjunctivae normal.      Pupils: Pupils are equal, round, and reactive to light. Neck:      Comments: No thyromegaly  Cardiovascular:      Rate and Rhythm: Normal rate and regular rhythm. Heart sounds: Normal heart sounds. No murmur heard. No friction rub. No gallop. Pulmonary:      Effort: Pulmonary effort is normal. No respiratory distress. Breath sounds: Normal breath sounds. No stridor. No wheezing, rhonchi or rales. Abdominal:      General: Bowel sounds are normal. There is no distension. Palpations: Abdomen is soft. There is no mass. Tenderness: There is no abdominal tenderness. There is no guarding. Comments: There is no hepatosplenomegaly present   Musculoskeletal:      Cervical back: Neck supple. Lymphadenopathy:      Cervical: No cervical adenopathy. Psychiatric:         Mood and Affect: Mood normal.         Behavior: Behavior normal.         Thought Content:  Thought content normal.         Judgment: Judgment normal.      Extremities:  Without cyanosis, clubbing, or edema    Adriana Corbett DO

## 2023-11-14 NOTE — PATIENT INSTRUCTIONS
Medicare Preventive Visit Patient Instructions  Thank you for completing your Welcome to Medicare Visit or Medicare Annual Wellness Visit today. Your next wellness visit will be due in one year (11/14/2024). The screening/preventive services that you may require over the next 5-10 years are detailed below. Some tests may not apply to you based off risk factors and/or age. Screening tests ordered at today's visit but not completed yet may show as past due. Also, please note that scanned in results may not display below. Preventive Screenings:  Service Recommendations Previous Testing/Comments   Colorectal Cancer Screening  Colonoscopy    Fecal Occult Blood Test (FOBT)/Fecal Immunochemical Test (FIT)  Fecal DNA/Cologuard Test  Flexible Sigmoidoscopy Age: 43-73 years old   Colonoscopy: every 10 years (May be performed more frequently if at higher risk)  OR  FOBT/FIT: every 1 year  OR  Cologuard: every 3 years  OR  Sigmoidoscopy: every 5 years  Screening may be recommended earlier than age 39 if at higher risk for colorectal cancer. Also, an individualized decision between you and your healthcare provider will decide whether screening between the ages of 77-80 would be appropriate.  Colonoscopy: 04/20/2023  FOBT/FIT: Not on file  Cologuard: Not on file  Sigmoidoscopy: Not on file    Screening Current     Prostate Cancer Screening Individualized decision between patient and health care provider in men between ages of 53-66   Medicare will cover every 12 months beginning on the day after your 50th birthday PSA: 1.2 ng/mL     Screening Current     Hepatitis C Screening Once for adults born between 1945 and 1965  More frequently in patients at high risk for Hepatitis C Hep C Antibody: 03/03/2023    Screening Current   Diabetes Screening 1-2 times per year if you're at risk for diabetes or have pre-diabetes Fasting glucose: 90 mg/dL (11/8/2023)  A1C: 5.5 % (1/5/2019)  Screening Current   Cholesterol Screening Once every 5 years if you don't have a lipid disorder. May order more often based on risk factors. Lipid panel: 11/08/2023  Screening Current      Other Preventive Screenings Covered by Medicare:  Abdominal Aortic Aneurysm (AAA) Screening: covered once if your at risk. You're considered to be at risk if you have a family history of AAA or a male between the age of 70-76 who smoking at least 100 cigarettes in your lifetime. Lung Cancer Screening: covers low dose CT scan once per year if you meet all of the following conditions: (1) Age 48-67; (2) No signs or symptoms of lung cancer; (3) Current smoker or have quit smoking within the last 15 years; (4) You have a tobacco smoking history of at least 20 pack years (packs per day x number of years you smoked); (5) You get a written order from a healthcare provider. Glaucoma Screening: covered annually if you're considered high risk: (1) You have diabetes OR (2) Family history of glaucoma OR (3)  aged 48 and older OR (3)  American aged 72 and older  Osteoporosis Screening: covered every 2 years if you meet one of the following conditions: (1) Have a vertebral abnormality; (2) On glucocorticoid therapy for more than 3 months; (3) Have primary hyperparathyroidism; (4) On osteoporosis medications and need to assess response to drug therapy. HIV Screening: covered annually if you're between the age of 14-79. Also covered annually if you are younger than 13 and older than 72 with risk factors for HIV infection. For pregnant patients, it is covered up to 3 times per pregnancy.     Immunizations:  Immunization Recommendations   Influenza Vaccine Annual influenza vaccination during flu season is recommended for all persons aged >= 6 months who do not have contraindications   Pneumococcal Vaccine   * Pneumococcal conjugate vaccine = PCV13 (Prevnar 13), PCV15 (Vaxneuvance), PCV20 (Prevnar 20)  * Pneumococcal polysaccharide vaccine = PPSV23 (Pneumovax) Adults 19-65 yo with certain risk factors or if 69+ yo  If never received any pneumonia vaccine: recommend Prevnar 20 (PCV20)  Give PCV20 if previously received 1 dose of PCV13 or PPSV23   Hepatitis B Vaccine 3 dose series if at intermediate or high risk (ex: diabetes, end stage renal disease, liver disease)   Respiratory syncytial virus (RSV) Vaccine - COVERED BY MEDICARE PART D  * RSVPreF3 (Arexvy) CDC recommends that adults 61years of age and older may receive a single dose of RSV vaccine using shared clinical decision-making (SCDM)   Tetanus (Td) Vaccine - COST NOT COVERED BY MEDICARE PART B Following completion of primary series, a booster dose should be given every 10 years to maintain immunity against tetanus. Td may also be given as tetanus wound prophylaxis. Tdap Vaccine - COST NOT COVERED BY MEDICARE PART B Recommended at least once for all adults. For pregnant patients, recommended with each pregnancy. Shingles Vaccine (Shingrix) - COST NOT COVERED BY MEDICARE PART B  2 shot series recommended in those 23 years and older who have or will have weakened immune systems or those 50 years and older     Health Maintenance Due:      Topic Date Due   • HIV Screening  Never done   • Colorectal Cancer Screening  04/20/2026   • Hepatitis C Screening  Completed     Immunizations Due:      Topic Date Due   • COVID-19 Vaccine (4 - Moderna series) 01/10/2022   • Influenza Vaccine (1) 09/01/2023     Advance Directives   What are advance directives? Advance directives are legal documents that state your wishes and plans for medical care. These plans are made ahead of time in case you lose your ability to make decisions for yourself. Advance directives can apply to any medical decision, such as the treatments you want, and if you want to donate organs. What are the types of advance directives? There are many types of advance directives, and each state has rules about how to use them.  You may choose a combination of any of the following:  Living will: This is a written record of the treatment you want. You can also choose which treatments you do not want, which to limit, and which to stop at a certain time. This includes surgery, medicine, IV fluid, and tube feedings. Durable power of  for healthcare [facility]): This is a written record that states who you want to make healthcare choices for you when you are unable to make them for yourself. This person, called a proxy, is usually a family member or a friend. You may choose more than 1 proxy. Do not resuscitate (DNR) order:  A DNR order is used in case your heart stops beating or you stop breathing. It is a request not to have certain forms of treatment, such as CPR. A DNR order may be included in other types of advance directives. Medical directive: This covers the care that you want if you are in a coma, near death, or unable to make decisions for yourself. You can list the treatments you want for each condition. Treatment may include pain medicine, surgery, blood transfusions, dialysis, IV or tube feedings, and a ventilator (breathing machine). Values history: This document has questions about your views, beliefs, and how you feel and think about life. This information can help others choose the care that you would choose. Why are advance directives important? An advance directive helps you control your care. Although spoken wishes may be used, it is better to have your wishes written down. Spoken wishes can be misunderstood, or not followed. Treatments may be given even if you do not want them. An advance directive may make it easier for your family to make difficult choices about your care. Weight Management   Why it is important to manage your weight:  Being overweight increases your risk of health conditions such as heart disease, high blood pressure, type 2 diabetes, and certain types of cancer.  It can also increase your risk for osteoarthritis, sleep apnea, and other respiratory problems. Aim for a slow, steady weight loss. Even a small amount of weight loss can lower your risk of health problems. How to lose weight safely:  A safe and healthy way to lose weight is to eat fewer calories and get regular exercise. You can lose up about 1 pound a week by decreasing the number of calories you eat by 500 calories each day. Healthy meal plan for weight management:  A healthy meal plan includes a variety of foods, contains fewer calories, and helps you stay healthy. A healthy meal plan includes the following:  Eat whole-grain foods more often. A healthy meal plan should contain fiber. Fiber is the part of grains, fruits, and vegetables that is not broken down by your body. Whole-grain foods are healthy and provide extra fiber in your diet. Some examples of whole-grain foods are whole-wheat breads and pastas, oatmeal, brown rice, and bulgur. Eat a variety of vegetables every day. Include dark, leafy greens such as spinach, kale, lilliam greens, and mustard greens. Eat yellow and orange vegetables such as carrots, sweet potatoes, and winter squash. Eat a variety of fruits every day. Choose fresh or canned fruit (canned in its own juice or light syrup) instead of juice. Fruit juice has very little or no fiber. Eat low-fat dairy foods. Drink fat-free (skim) milk or 1% milk. Eat fat-free yogurt and low-fat cottage cheese. Try low-fat cheeses such as mozzarella and other reduced-fat cheeses. Choose meat and other protein foods that are low in fat. Choose beans or other legumes such as split peas or lentils. Choose fish, skinless poultry (chicken or turkey), or lean cuts of red meat (beef or pork). Before you cook meat or poultry, cut off any visible fat. Use less fat and oil. Try baking foods instead of frying them. Add less fat, such as margarine, sour cream, regular salad dressing and mayonnaise to foods. Eat fewer high-fat foods.  Some examples of high-fat foods include french fries, doughnuts, ice cream, and cakes. Eat fewer sweets. Limit foods and drinks that are high in sugar. This includes candy, cookies, regular soda, and sweetened drinks. Exercise:  Exercise at least 30 minutes per day on most days of the week. Some examples of exercise include walking, biking, dancing, and swimming. You can also fit in more physical activity by taking the stairs instead of the elevator or parking farther away from stores. Ask your healthcare provider about the best exercise plan for you. © Copyright [employer] 2018 Information is for End User's use only and may not be sold, redistributed or otherwise used for commercial purposes.  All illustrations and images included in CareNotes® are the copyrighted property of [employer] Inc[employer] or [address] [place] [address]

## 2023-11-15 NOTE — ASSESSMENT & PLAN NOTE
Patient has seizure disorder. I reviewed his labs. His most recent Keppra level was just below 10. He remains asymptomatic so going to continue him on his current regiment.   I refilled his Keppra dose of 500 mg twice a day

## 2023-11-15 NOTE — ASSESSMENT & PLAN NOTE
I refilled the patient's levocarnitine 330 mg. He will continue 1 every morning and 2 every evening.

## 2024-01-13 PROBLEM — Z00.00 MEDICARE ANNUAL WELLNESS VISIT, SUBSEQUENT: Status: RESOLVED | Noted: 2023-11-14 | Resolved: 2024-01-13

## 2024-05-17 ENCOUNTER — OFFICE VISIT (OUTPATIENT)
Dept: FAMILY MEDICINE CLINIC | Facility: CLINIC | Age: 54
End: 2024-05-17
Payer: MEDICARE

## 2024-05-17 VITALS
WEIGHT: 146.3 LBS | HEIGHT: 62 IN | HEART RATE: 64 BPM | TEMPERATURE: 97 F | DIASTOLIC BLOOD PRESSURE: 64 MMHG | OXYGEN SATURATION: 99 % | SYSTOLIC BLOOD PRESSURE: 117 MMHG | BODY MASS INDEX: 26.92 KG/M2

## 2024-05-17 DIAGNOSIS — Z79.899 ENCOUNTER FOR LONG-TERM (CURRENT) USE OF MEDICATIONS: ICD-10-CM

## 2024-05-17 DIAGNOSIS — Q03.0 HYDROCEPHALUS ASSOCIATED WITH CONGENITAL AQUEDUCT STENOSIS (HCC): ICD-10-CM

## 2024-05-17 DIAGNOSIS — G40.909 SEIZURE DISORDER (HCC): Primary | ICD-10-CM

## 2024-05-17 DIAGNOSIS — G91.1 OBSTRUCTIVE HYDROCEPHALUS (HCC): ICD-10-CM

## 2024-05-17 DIAGNOSIS — Z12.5 PROSTATE CANCER SCREENING: ICD-10-CM

## 2024-05-17 DIAGNOSIS — E71.40 CARNITINE DEFICIENCY (HCC): ICD-10-CM

## 2024-05-17 DIAGNOSIS — J30.1 SEASONAL ALLERGIC RHINITIS DUE TO POLLEN: ICD-10-CM

## 2024-05-17 PROCEDURE — G2211 COMPLEX E/M VISIT ADD ON: HCPCS | Performed by: FAMILY MEDICINE

## 2024-05-17 PROCEDURE — 99214 OFFICE O/P EST MOD 30 MIN: CPT | Performed by: FAMILY MEDICINE

## 2024-05-17 NOTE — PROGRESS NOTES
"Ambulatory Visit  Name: Boris Dukes      : 1970      MRN: [id number]  Encounter Provider: Raymond Darden DO  Encounter Date: 2024   Encounter department: [facility] PRIMARY CARE    Assessment & Plan   1. Seizure disorder (HCC)  Assessment & Plan:  Patient has a seizure disorder which is currently stable.  He will continue Keppra 500 mg twice daily.  A Keppra level was ordered.  Orders:  -     Levetiracetam level; Future  2. Prostate cancer screening  -     PSA, Total Screen; Future  3. Encounter for long-term (current) use of medications  -     Levetiracetam level; Future  4. 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 Obstructive hydrocephalus (HCC)  6. Carnitine deficiency (HCC)  Assessment & Plan:  Patient will continue levocarnitine 330 mg.  He will take 1 every morning and 2 every evening.  7. Seasonal allergic rhinitis due to pollen  Assessment & Plan:  Patient has allergies which are currently stable.  Continue loratadine 10 mg daily.       History of Present Illness     This is a 53-year-old white male who presents to the office today accompanied by his father.  The patient is doing well.  He has not had any headaches.  He denies any dizziness or lightheadedness.  He denies any ataxia.  He has not had any seizures.  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  He is relatively inactive.  He does help his parents around the house.        Review of Systems   Cardiovascular:  Negative for chest pain, palpitations and leg swelling.   Gastrointestinal:  Negative for abdominal distention, abdominal pain, blood in stool, constipation, diarrhea and nausea.   Genitourinary:         Patient denies nocturia and weak urinary stream   Allergic/Immunologic: Positive for environmental allergies.   Neurological:  Negative for dizziness, seizures, light-headedness and headaches.       Objective     /64   Pulse 64   Temp (!) 97 °F (36.1 °C) (Tympanic)   Ht 5' 2\" " (1.575 m)   Wt 66.4 kg (146 lb 4.8 oz)   SpO2 99%   BMI 26.76 kg/m²     Physical Exam  Vitals reviewed.   Constitutional:       Comments: Patient is a 53-year-old white male who appears his stated age.  He is pleasant, cooperative, and in no distress.   HENT:      Head: Normocephalic and atraumatic.      Right Ear: Tympanic membrane, ear canal and external ear normal. There is no impacted cerumen.      Left Ear: Tympanic membrane, ear canal and external ear normal. There is no impacted cerumen.      Mouth/Throat:      Mouth: Mucous membranes are moist.      Pharynx: Oropharynx is clear. No oropharyngeal exudate or posterior oropharyngeal erythema.   Eyes:      General: No scleral icterus.        Right eye: No discharge.         Left eye: No discharge.      Conjunctiva/sclera: Conjunctivae normal.      Pupils: Pupils are equal, round, and reactive to light.   Cardiovascular:      Rate and Rhythm: Normal rate and regular rhythm.      Heart sounds: Normal heart sounds. No murmur heard.     No friction rub. No gallop.   Pulmonary:      Effort: Pulmonary effort is normal. No respiratory distress.      Breath sounds: Normal breath sounds. No stridor. No wheezing, rhonchi or rales.   Abdominal:      General: Bowel sounds are normal. There is no distension.      Palpations: Abdomen is soft. There is no mass.      Tenderness: There is no abdominal tenderness. There is no guarding.   Musculoskeletal:      Cervical back: Neck supple.   Lymphadenopathy:      Cervical: No cervical adenopathy.   Psychiatric:         Mood and Affect: Mood normal.         Behavior: Behavior normal.         Thought Content: Thought content normal.         Judgment: Judgment normal.     Extremities:  Without cyanosis, clubbing, or edema  Administrative Statements

## 2024-05-22 NOTE — ASSESSMENT & PLAN NOTE
Patient has a seizure disorder which is currently stable.  He will continue Keppra 500 mg twice daily.  A Keppra level was ordered.

## 2024-06-12 ENCOUNTER — APPOINTMENT (OUTPATIENT)
Dept: LAB | Facility: CLINIC | Age: 54
End: 2024-06-12
Payer: MEDICARE

## 2024-06-12 ENCOUNTER — TELEPHONE (OUTPATIENT)
Dept: FAMILY MEDICINE CLINIC | Facility: CLINIC | Age: 54
End: 2024-06-12

## 2024-06-12 DIAGNOSIS — G40.909 SEIZURE DISORDER (HCC): ICD-10-CM

## 2024-06-12 DIAGNOSIS — Z79.899 ENCOUNTER FOR LONG-TERM (CURRENT) USE OF MEDICATIONS: ICD-10-CM

## 2024-06-12 DIAGNOSIS — Z12.5 PROSTATE CANCER SCREENING: ICD-10-CM

## 2024-06-12 LAB
LEVETIRACETAM SERPL-MCNC: 16.4 UG/ML (ref 12–46)
PSA SERPL-MCNC: 1.54 NG/ML (ref 0–4)

## 2024-06-12 PROCEDURE — 36415 COLL VENOUS BLD VENIPUNCTURE: CPT

## 2024-06-12 PROCEDURE — G0103 PSA SCREENING: HCPCS

## 2024-06-12 PROCEDURE — 83520 IMMUNOASSAY QUANT NOS NONAB: CPT

## 2024-06-12 NOTE — TELEPHONE ENCOUNTER
----- Message from Raymond Darden DO sent at 6/12/2024  5:59 PM EDT -----  PSA and Keppra levels were normal.

## 2024-06-21 PROBLEM — Z12.5 PROSTATE CANCER SCREENING: Status: RESOLVED | Noted: 2022-11-07 | Resolved: 2024-06-21

## 2024-10-17 DIAGNOSIS — E71.40 CARNITINE DEFICIENCY (HCC): ICD-10-CM

## 2024-10-17 DIAGNOSIS — G40.909 SEIZURE DISORDER (HCC): ICD-10-CM

## 2024-10-18 RX ORDER — LEVOCARNITINE 330 MG/1
TABLET ORAL
Qty: 270 TABLET | Refills: 3 | Status: SHIPPED | OUTPATIENT
Start: 2024-10-18

## 2024-10-18 RX ORDER — LEVETIRACETAM 500 MG/1
TABLET ORAL
Qty: 180 TABLET | Refills: 0 | Status: SHIPPED | OUTPATIENT
Start: 2024-10-18

## 2024-11-06 ENCOUNTER — RA CDI HCC (OUTPATIENT)
Dept: OTHER | Facility: HOSPITAL | Age: 54
End: 2024-11-06

## 2024-11-06 PROBLEM — Z23 IMMUNIZATION DUE: Status: RESOLVED | Noted: 2020-10-01 | Resolved: 2024-11-06

## 2024-11-06 PROBLEM — Z23 ENCOUNTER FOR IMMUNIZATION: Status: RESOLVED | Noted: 2022-11-07 | Resolved: 2024-11-06

## 2024-11-06 PROBLEM — Z79.899 ENCOUNTER FOR LONG-TERM (CURRENT) USE OF MEDICATIONS: Status: RESOLVED | Noted: 2022-05-12 | Resolved: 2024-11-06

## 2024-11-06 PROBLEM — Z11.59 ENCOUNTER FOR HEPATITIS C SCREENING TEST FOR LOW RISK PATIENT: Status: RESOLVED | Noted: 2022-11-07 | Resolved: 2024-11-06

## 2024-11-15 ENCOUNTER — OFFICE VISIT (OUTPATIENT)
Dept: FAMILY MEDICINE CLINIC | Facility: CLINIC | Age: 54
End: 2024-11-15
Payer: MEDICARE

## 2024-11-15 VITALS
HEART RATE: 61 BPM | BODY MASS INDEX: 27.07 KG/M2 | HEIGHT: 62 IN | OXYGEN SATURATION: 99 % | WEIGHT: 147.1 LBS | DIASTOLIC BLOOD PRESSURE: 72 MMHG | TEMPERATURE: 96.9 F | SYSTOLIC BLOOD PRESSURE: 112 MMHG

## 2024-11-15 DIAGNOSIS — Z79.899 ENCOUNTER FOR LONG-TERM (CURRENT) USE OF MEDICATIONS: ICD-10-CM

## 2024-11-15 DIAGNOSIS — G40.909 SEIZURE DISORDER (HCC): Primary | ICD-10-CM

## 2024-11-15 DIAGNOSIS — E71.40 CARNITINE DEFICIENCY (HCC): ICD-10-CM

## 2024-11-15 DIAGNOSIS — Z00.00 MEDICARE ANNUAL WELLNESS VISIT, SUBSEQUENT: ICD-10-CM

## 2024-11-15 DIAGNOSIS — M41.125 ADOLESCENT IDIOPATHIC SCOLIOSIS OF THORACOLUMBAR REGION: ICD-10-CM

## 2024-11-15 DIAGNOSIS — Z23 NEED FOR COVID-19 VACCINE: ICD-10-CM

## 2024-11-15 DIAGNOSIS — E78.5 DYSLIPIDEMIA: ICD-10-CM

## 2024-11-15 DIAGNOSIS — Z23 ENCOUNTER FOR IMMUNIZATION: ICD-10-CM

## 2024-11-15 DIAGNOSIS — Z00.00 ENCOUNTER FOR MEDICARE ANNUAL WELLNESS EXAM: ICD-10-CM

## 2024-11-15 PROCEDURE — 90673 RIV3 VACCINE NO PRESERV IM: CPT

## 2024-11-15 PROCEDURE — 91320 SARSCV2 VAC 30MCG TRS-SUC IM: CPT

## 2024-11-15 PROCEDURE — G0008 ADMIN INFLUENZA VIRUS VAC: HCPCS

## 2024-11-15 PROCEDURE — 99214 OFFICE O/P EST MOD 30 MIN: CPT | Performed by: FAMILY MEDICINE

## 2024-11-15 PROCEDURE — G0439 PPPS, SUBSEQ VISIT: HCPCS | Performed by: FAMILY MEDICINE

## 2024-11-15 PROCEDURE — 90480 ADMN SARSCOV2 VAC 1/ONLY CMP: CPT

## 2024-11-15 NOTE — PATIENT INSTRUCTIONS
Medicare Preventive Visit Patient Instructions  Thank you for completing your Welcome to Medicare Visit or Medicare Annual Wellness Visit today. Your next wellness visit will be due in one year (11/16/2025).  The screening/preventive services that you may require over the next 5-10 years are detailed below. Some tests may not apply to you based off risk factors and/or age. Screening tests ordered at today's visit but not completed yet may show as past due. Also, please note that scanned in results may not display below.  Preventive Screenings:  Service Recommendations Previous Testing/Comments   Colorectal Cancer Screening  Colonoscopy    Fecal Occult Blood Test (FOBT)/Fecal Immunochemical Test (FIT)  Fecal DNA/Cologuard Test  Flexible Sigmoidoscopy Age: 45-75 years old   Colonoscopy: every 10 years (May be performed more frequently if at higher risk)  OR  FOBT/FIT: every 1 year  OR  Cologuard: every 3 years  OR  Sigmoidoscopy: every 5 years  Screening may be recommended earlier than age 45 if at higher risk for colorectal cancer. Also, an individualized decision between you and your healthcare provider will decide whether screening between the ages of 76-85 would be appropriate. Colonoscopy: 04/20/2023  FOBT/FIT: Not on file  Cologuard: Not on file  Sigmoidoscopy: Not on file    Screening Current     Prostate Cancer Screening Individualized decision between patient and health care provider in men between ages of 55-69   Medicare will cover every 12 months beginning on the day after your 50th birthday PSA: 1.536 ng/mL     Screening Current     Hepatitis C Screening Once for adults born between 1945 and 1965  More frequently in patients at high risk for Hepatitis C Hep C Antibody: 03/03/2023    Screening Current   Diabetes Screening 1-2 times per year if you're at risk for diabetes or have pre-diabetes Fasting glucose: 90 mg/dL (11/8/2023)  A1C: No results in last 5 years (No results in last 5 years)      Cholesterol  Screening Once every 5 years if you don't have a lipid disorder. May order more often based on risk factors. Lipid panel: 11/08/2023  Screening Current      Other Preventive Screenings Covered by Medicare:  Abdominal Aortic Aneurysm (AAA) Screening: covered once if your at risk. You're considered to be at risk if you have a family history of AAA or a male between the age of 65-75 who smoking at least 100 cigarettes in your lifetime.  Lung Cancer Screening: covers low dose CT scan once per year if you meet all of the following conditions: (1) Age 55-77; (2) No signs or symptoms of lung cancer; (3) Current smoker or have quit smoking within the last 15 years; (4) You have a tobacco smoking history of at least 20 pack years (packs per day x number of years you smoked); (5) You get a written order from a healthcare provider.  Glaucoma Screening: covered annually if you're considered high risk: (1) You have diabetes OR (2) Family history of glaucoma OR (3)  aged 50 and older OR (4)  American aged 65 and older  Osteoporosis Screening: covered every 2 years if you meet one of the following conditions: (1) Have a vertebral abnormality; (2) On glucocorticoid therapy for more than 3 months; (3) Have primary hyperparathyroidism; (4) On osteoporosis medications and need to assess response to drug therapy.  HIV Screening: covered annually if you're between the age of 15-65. Also covered annually if you are younger than 15 and older than 65 with risk factors for HIV infection. For pregnant patients, it is covered up to 3 times per pregnancy.    Immunizations:  Immunization Recommendations   Influenza Vaccine Annual influenza vaccination during flu season is recommended for all persons aged >= 6 months who do not have contraindications   Pneumococcal Vaccine   * Pneumococcal conjugate vaccine = PCV13 (Prevnar 13), PCV15 (Vaxneuvance), PCV20 (Prevnar 20)  * Pneumococcal polysaccharide vaccine = PPSV23  (Pneumovax) Adults 19-63 yo with certain risk factors or if 65+ yo  If never received any pneumonia vaccine: recommend Prevnar 20 (PCV20)  Give PCV20 if previously received 1 dose of PCV13 or PPSV23   Hepatitis B Vaccine 3 dose series if at intermediate or high risk (ex: diabetes, end stage renal disease, liver disease)   Respiratory syncytial virus (RSV) Vaccine - COVERED BY MEDICARE PART D  * RSVPreF3 (Arexvy) CDC recommends that adults 60 years of age and older may receive a single dose of RSV vaccine using shared clinical decision-making (SCDM)   Tetanus (Td) Vaccine - COST NOT COVERED BY MEDICARE PART B Following completion of primary series, a booster dose should be given every 10 years to maintain immunity against tetanus. Td may also be given as tetanus wound prophylaxis.   Tdap Vaccine - COST NOT COVERED BY MEDICARE PART B Recommended at least once for all adults. For pregnant patients, recommended with each pregnancy.   Shingles Vaccine (Shingrix) - COST NOT COVERED BY MEDICARE PART B  2 shot series recommended in those 19 years and older who have or will have weakened immune systems or those 50 years and older     Health Maintenance Due:      Topic Date Due   • HIV Screening  Never done   • Colorectal Cancer Screening  04/20/2026   • Hepatitis C Screening  Completed     Immunizations Due:  There are no preventive care reminders to display for this patient.  Advance Directives   What are advance directives?  Advance directives are legal documents that state your wishes and plans for medical care. These plans are made ahead of time in case you lose your ability to make decisions for yourself. Advance directives can apply to any medical decision, such as the treatments you want, and if you want to donate organs.   What are the types of advance directives?  There are many types of advance directives, and each state has rules about how to use them. You may choose a combination of any of the following:  Living  will:  This is a written record of the treatment you want. You can also choose which treatments you do not want, which to limit, and which to stop at a certain time. This includes surgery, medicine, IV fluid, and tube feedings.   Durable power of  for healthcare (DPAHC):  This is a written record that states who you want to make healthcare choices for you when you are unable to make them for yourself. This person, called a proxy, is usually a family member or a friend. You may choose more than 1 proxy.  Do not resuscitate (DNR) order:  A DNR order is used in case your heart stops beating or you stop breathing. It is a request not to have certain forms of treatment, such as CPR. A DNR order may be included in other types of advance directives.  Medical directive:  This covers the care that you want if you are in a coma, near death, or unable to make decisions for yourself. You can list the treatments you want for each condition. Treatment may include pain medicine, surgery, blood transfusions, dialysis, IV or tube feedings, and a ventilator (breathing machine).  Values history:  This document has questions about your views, beliefs, and how you feel and think about life. This information can help others choose the care that you would choose.  Why are advance directives important?  An advance directive helps you control your care. Although spoken wishes may be used, it is better to have your wishes written down. Spoken wishes can be misunderstood, or not followed. Treatments may be given even if you do not want them. An advance directive may make it easier for your family to make difficult choices about your care.   Weight Management   Why it is important to manage your weight:  Being overweight increases your risk of health conditions such as heart disease, high blood pressure, type 2 diabetes, and certain types of cancer. It can also increase your risk for osteoarthritis, sleep apnea, and other respiratory  problems. Aim for a slow, steady weight loss. Even a small amount of weight loss can lower your risk of health problems.  How to lose weight safely:  A safe and healthy way to lose weight is to eat fewer calories and get regular exercise. You can lose up about 1 pound a week by decreasing the number of calories you eat by 500 calories each day.   Healthy meal plan for weight management:  A healthy meal plan includes a variety of foods, contains fewer calories, and helps you stay healthy. A healthy meal plan includes the following:  Eat whole-grain foods more often.  A healthy meal plan should contain fiber. Fiber is the part of grains, fruits, and vegetables that is not broken down by your body. Whole-grain foods are healthy and provide extra fiber in your diet. Some examples of whole-grain foods are whole-wheat breads and pastas, oatmeal, brown rice, and bulgur.  Eat a variety of vegetables every day.  Include dark, leafy greens such as spinach, kale, lilliam greens, and mustard greens. Eat yellow and orange vegetables such as carrots, sweet potatoes, and winter squash.   Eat a variety of fruits every day.  Choose fresh or canned fruit (canned in its own juice or light syrup) instead of juice. Fruit juice has very little or no fiber.  Eat low-fat dairy foods.  Drink fat-free (skim) milk or 1% milk. Eat fat-free yogurt and low-fat cottage cheese. Try low-fat cheeses such as mozzarella and other reduced-fat cheeses.  Choose meat and other protein foods that are low in fat.  Choose beans or other legumes such as split peas or lentils. Choose fish, skinless poultry (chicken or turkey), or lean cuts of red meat (beef or pork). Before you cook meat or poultry, cut off any visible fat.   Use less fat and oil.  Try baking foods instead of frying them. Add less fat, such as margarine, sour cream, regular salad dressing and mayonnaise to foods. Eat fewer high-fat foods. Some examples of high-fat foods include french fries,  doughnuts, ice cream, and cakes.  Eat fewer sweets.  Limit foods and drinks that are high in sugar. This includes candy, cookies, regular soda, and sweetened drinks.  Exercise:  Exercise at least 30 minutes per day on most days of the week. Some examples of exercise include walking, biking, dancing, and swimming. You can also fit in more physical activity by taking the stairs instead of the elevator or parking farther away from stores. Ask your healthcare provider about the best exercise plan for you.      © Copyright [employer] 2018 Information is for End User's use only and may not be sold, redistributed or otherwise used for commercial purposes. All illustrations and images included in CareNotes® are the copyrighted property of [employer] or [employer]

## 2024-11-15 NOTE — PROGRESS NOTES
Name: Boris Dukes      : 1970      MRN: [id number]  Encounter Provider: Raymond Darden DO  Encounter Date: 11/15/2024   Encounter department: [facility] PRIMARY CARE    Assessment & Plan  Seizure disorder (HCC)  Patient has a seizure disorder which is currently stable.  I ordered a CMP and a Keppra level.  Patient will continue Keppra 500 mg twice daily.  Orders:    Comprehensive metabolic panel; Future    Levetiracetam level; Future    Carnitine deficiency (HCC)  Patient has a carnitine deficiency.  He will continue levocarnitine 330 mg in the morning and 660 mg in the evening.    I encouraged the patient to exercise regularly.  The patient does have Silver sneakers associated with his healthcare insurance.  I suggested he joined the gym across the street.         Adolescent idiopathic scoliosis of thoracolumbar region  Patient has chronic right-sided lower back pain as a result of his scoliosis.  I recommended moist heat to the affected area and Tylenol as needed.  If his symptoms worsen, he should contact me.  At this time, no further workup is recommended.         Need for COVID-19 vaccine    Orders:    COVID-19 Pfizer mRNA vaccine 12 yr and older (Comirnaty pre-filled syringe)    Dyslipidemia    Orders:    Lipid Panel with Direct LDL reflex; Future    Encounter for long-term (current) use of medications    Orders:    CBC and differential; Future    Levetiracetam level; Future    Encounter for immunization    Orders:    influenza vaccine, recombinant, PF, 0.5 mL IM (Flublok)    Encounter for Medicare annual wellness exam         Medicare annual wellness visit, subsequent            Preventive health issues were discussed with patient, and age appropriate screening tests were ordered as noted in patient's After Visit Summary. Personalized health advice and appropriate referrals for health education or preventive services given if needed, as noted in patient's After Visit Summary.    History of  Present Illness     This patient is a 54-year-old white male who presents to the office today for his routine checkup as well as for his annual Medicare wellness exam.  The patient is doing well and has no complaints.  He has not had any seizures.  He reports compliance with his medication.  He helps out around the house with his mother but otherwise, he is very inactive.       Patient Care Team:  Raymond Darden DO as PCP - General (Family Medicine)    Review of Systems   HENT:  Negative for hearing loss.    Eyes:  Negative for visual disturbance.   Cardiovascular:  Negative for chest pain, palpitations and leg swelling.   Gastrointestinal:  Negative for abdominal distention, abdominal pain, blood in stool, constipation, diarrhea and nausea.   Musculoskeletal:  Positive for back pain (Pain is chronic and radiates into the right buttock).     Medical History Reviewed by provider this encounter:       Annual Wellness Visit Questionnaire   Boris is here for his Subsequent Wellness visit. Last Medicare Wellness visit information reviewed, patient interviewed and updates made to the record today.      Health Risk Assessment:   Patient rates overall health as good. Patient feels that their physical health rating is same. Patient is satisfied with their life. Eyesight was rated as same. Hearing was rated as same. Patient feels that their emotional and mental health rating is same. Patients states they are never, rarely angry. Patient states they are never, rarely unusually tired/fatigued. Pain experienced in the last 7 days has been none. Patient states that he has experienced no weight loss or gain in last 6 months.     Depression Screening:   PHQ-2 Score: 0      Fall Risk Screening:   In the past year, patient has experienced: no history of falling in past year      Home Safety:  Patient does not have trouble with stairs inside or outside of their home. Patient has working smoke alarms and has working carbon monoxide  detector. Home safety hazards include: none.     Nutrition:   Current diet is Regular.     Medications:   Patient is not currently taking any over-the-counter supplements. Patient is able to manage medications.     Activities of Daily Living (ADLs)/Instrumental Activities of Daily Living (IADLs):   Walk and transfer into and out of bed and chair?: Yes  Dress and groom yourself?: Yes    Bathe or shower yourself?: Yes    Feed yourself? Yes  Do your laundry/housekeeping?: Yes  Manage your money, pay your bills and track your expenses?: Yes  Make your own meals?: Yes    Do your own shopping?: Yes    Previous Hospitalizations:   Any hospitalizations or ED visits within the last 12 months?: No      Advance Care Planning:   Living will: No    Durable POA for healthcare: Yes      Cognitive Screening:   Provider or family/friend/caregiver concerned regarding cognition?: No    PREVENTIVE SCREENINGS      Cardiovascular Screening:    General: Screening Current      Colorectal Cancer Screening:     General: Screening Current      Prostate Cancer Screening:    General: Screening Current      Osteoporosis Screening:    General: Screening Not Indicated      Abdominal Aortic Aneurysm (AAA) Screening:        General: Screening Not Indicated      Lung Cancer Screening:     General: Screening Not Indicated      Hepatitis C Screening:    General: Screening Current    Screening, Brief Intervention, and Referral to Treatment (SBIRT)    Screening  Typical number of drinks in a day: 0  Typical number of drinks in a week: 0  Interpretation: Low risk drinking behavior.    AUDIT-C Screenin) How often did you have a drink containing alcohol in the past year? never  2) How many drinks did you have on a typical day when you were drinking in the past year? 0  3) How often did you have 6 or more drinks on one occasion in the past year? never    AUDIT-C Score: 0  Interpretation: Score 0-3 (male): Negative screen for alcohol misuse    Single  "Item Drug Screening:  How often have you used an illegal drug (including marijuana) or a prescription medication for non-medical reasons in the past year? never    Single Item Drug Screen Score: 0  Interpretation: Negative screen for possible drug use disorder    Social Drivers of Health     Financial Resource Strain: Low Risk  (11/14/2023)    Overall Financial Resource Strain (CARDIA)     Difficulty of Paying Living Expenses: Not hard at all   Food Insecurity: No Food Insecurity (11/15/2024)    Hunger Vital Sign     Worried About Running Out of Food in the Last Year: Never true     Ran Out of Food in the Last Year: Never true   Transportation Needs: No Transportation Needs (11/15/2024)    PRAPARE - Transportation     Lack of Transportation (Medical): No     Lack of Transportation (Non-Medical): No   Housing Stability: Low Risk  (11/15/2024)    Housing Stability Vital Sign     Unable to Pay for Housing in the Last Year: No     Number of Times Moved in the Last Year: 0     Homeless in the Last Year: No   Utilities: Not At Risk (11/15/2024)    [facility] Utilities     Threatened with loss of utilities: No     No results found.    Objective   /72 (Patient Position: Sitting, Cuff Size: Large)   Pulse 61   Temp (!) 96.9 °F (36.1 °C) (Tympanic)   Ht 5' 2\" (1.575 m)   Wt 66.7 kg (147 lb 1.6 oz)   SpO2 99%   BMI 26.90 kg/m²     Physical Exam  Vitals reviewed.   Constitutional:       Comments: Patient is a 54-year-old white male who appears his stated age.  Is pleasant, cooperative, and in no distress.   HENT:      Head: Normocephalic and atraumatic.      Right Ear: Tympanic membrane, ear canal and external ear normal. There is no impacted cerumen.      Left Ear: Tympanic membrane, ear canal and external ear normal. There is no impacted cerumen.      Mouth/Throat:      Mouth: Mucous membranes are moist.      Pharynx: Oropharynx is clear. No oropharyngeal exudate or posterior oropharyngeal erythema.   Eyes:      General: " No scleral icterus.        Right eye: No discharge.         Left eye: No discharge.      Conjunctiva/sclera: Conjunctivae normal.      Pupils: Pupils are equal, round, and reactive to light.   Neck:      Comments: There is no thyromegaly  Cardiovascular:      Rate and Rhythm: Normal rate and regular rhythm.      Heart sounds: Normal heart sounds. No murmur heard.     No friction rub. No gallop.   Pulmonary:      Effort: Pulmonary effort is normal. No respiratory distress.      Breath sounds: Normal breath sounds. No stridor. No wheezing, rhonchi or rales.   Abdominal:      General: Bowel sounds are normal. There is no distension.      Palpations: Abdomen is soft. There is no mass.      Tenderness: There is no abdominal tenderness. There is no guarding.      Comments: There is no organomegaly   Musculoskeletal:      Cervical back: Neck supple.   Lymphadenopathy:      Cervical: No cervical adenopathy.   Psychiatric:         Mood and Affect: Mood normal.         Behavior: Behavior normal.         Thought Content: Thought content normal.         Judgment: Judgment normal.     Extremities: Without cyanosis, clubbing, or edema

## 2024-11-15 NOTE — ASSESSMENT & PLAN NOTE
Patient has a seizure disorder which is currently stable.  I ordered a CMP and a Keppra level.  Patient will continue Keppra 500 mg twice daily.  Orders:    Comprehensive metabolic panel; Future    Levetiracetam level; Future

## 2024-11-15 NOTE — ASSESSMENT & PLAN NOTE
Patient has chronic right-sided lower back pain as a result of his scoliosis.  I recommended moist heat to the affected area and Tylenol as needed.  If his symptoms worsen, he should contact me.  At this time, no further workup is recommended.

## 2024-11-15 NOTE — ASSESSMENT & PLAN NOTE
Patient has a carnitine deficiency.  He will continue levocarnitine 330 mg in the morning and 660 mg in the evening.    I encouraged the patient to exercise regularly.  The patient does have Silver sneakers associated with his healthcare insurance.  I suggested he joined the gym across the street.

## 2024-12-15 PROBLEM — Z00.00 ENCOUNTER FOR MEDICARE ANNUAL WELLNESS EXAM: Status: RESOLVED | Noted: 2022-11-07 | Resolved: 2024-12-15

## 2024-12-30 DIAGNOSIS — G40.909 SEIZURE DISORDER (HCC): ICD-10-CM

## 2024-12-31 RX ORDER — LEVETIRACETAM 500 MG/1
500 TABLET ORAL 2 TIMES DAILY
Qty: 60 TABLET | Refills: 0 | Status: SHIPPED | OUTPATIENT
Start: 2024-12-31

## 2025-01-14 ENCOUNTER — HOSPITAL ENCOUNTER (EMERGENCY)
Facility: HOSPITAL | Age: 55
Discharge: HOME | End: 2025-01-14
Attending: EMERGENCY MEDICINE | Admitting: EMERGENCY MEDICINE
Payer: MEDICARE

## 2025-01-14 VITALS
DIASTOLIC BLOOD PRESSURE: 70 MMHG | OXYGEN SATURATION: 98 % | SYSTOLIC BLOOD PRESSURE: 118 MMHG | RESPIRATION RATE: 18 BRPM | WEIGHT: 155.42 LBS | TEMPERATURE: 97.9 F | HEART RATE: 67 BPM | BODY MASS INDEX: 28.43 KG/M2

## 2025-01-14 DIAGNOSIS — Z86.69 HISTORY OF CEREBRAL PALSY: ICD-10-CM
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
## 2025-01-14 PROCEDURE — 99284 EMERGENCY DEPT VISIT MOD MDM: CPT

## 2025-01-14 PROCEDURE — 99284 EMERGENCY DEPT VISIT MOD MDM: CPT | Performed by: EMERGENCY MEDICINE

## 2025-01-14 NOTE — ED PROVIDER NOTES
"        Kalyn Addison   2017 9:40 AM   Office Visit   MRN: [id number]    Department:  [place] [address]   Dept Phone:  [phone]    Description:  Female : 1976   Provider:  Jihan Sifuentes PA-C           Reason for Visit     Establish Care New to you      Allergies as of 2017     Allergen Noted Reactions    Food 2013       Rye  Causes swelling in the throat    Amoxicillin 2013   Vomiting    Codeine 2013   Rash    Septra [Bactrim] 2013   Rash      You were diagnosed with     Chronic bilateral low back pain without sciatica   [3200632]       Encounter for contraceptive management, unspecified contraceptive encounter type   [1284811]       History of colonic polyps   [021647]       Cigarette smoker   [747199]       Screening for diabetes mellitus   [V77.1.ICD-9-CM]       Screening for hyperlipidemia   [805674]         Vital Signs     Blood Pressure Pulse Temperature Respirations Height Weight    106/72 mmHg 72 36.8 °C (98.3 °F) 16 1.676 m (5' 5.98\") 75.2 kg (165 lb 12.6 oz)    Body Mass Index Oxygen Saturation Last Menstrual Period Breastfeeding? Smoking Status       26.77 kg/m2 98% 2017 No Current Every Day Smoker       Basic Information     Date Of Birth Sex Race Ethnicity Preferred Language    1976 Female White Non- English      Your appointments     Mar 27, 2017  9:40 AM   ANNUAL EXAM PREVENTATIVE with Jihan Sifuentes PA-C   [facility]    [address]  [address]  [place] [address] [phone]              Problem List              ICD-10-CM Priority Class Noted - Resolved    Contraception management Z30.9   3/11/2013 - Present    Cigarette smoker F17.210   3/11/2013 - Present    History of colonic polyps Z86.010   2017 - Present    Chronic bilateral low back pain without sciatica M54.5, G89.29   2017 - Present      Health Maintenance        Date Due Completion Dates    IMM " Time reflects when diagnosis was documented in both MDM as applicable and the Disposition within this note       Time User Action Codes Description Comment    1/14/2025  1:01 PM Yolanda Houston Add [W19.XXXA] Fall     1/14/2025  1:01 PM Yolanda Houston Add [Z86.69] History of cerebral palsy           ED Disposition       ED Disposition   Discharge    Condition   Stable    Date/Time   Tue Jan 14, 2025  1:01 PM    Comment   Boris Dukes discharge to home/self care.                   Assessment & Plan       Medical Decision Making  54-year-old male presents for evaluation after a fall at home.  Patient was a fall was due to his gait and balance issues at baseline, mom states she was concerned after the fall.  Patient appears to be in his neurological baseline, he feels his normal self and parents state that he is at his baseline as well.  Uncertain etiology of potential symptoms earlier today however without persistent headaches or other neurological symptoms, doubt  shunt malfunction.  Doubt ICH at this time, patient describes the headache as minor and has resolved on its own.  He said no recent fevers or infectious symptoms, doubt infection, meningitis.  He has been eating and drinking normally, has moist mucous membranes, doubt dehydration.  We discussed potentially an evaluation with basic labs however if he has been doing well otherwise these are likely to be normal.  We additionally discussed obtaining head imaging however this is additionally likely to be patient's normal without other neurological features at this time, he did not strike his head.  Patient ambulated in the emergency department without difficulty, family will continue to watch patient throughout the day, if symptoms return then surely reevaluation would be warranted.         ED Course as of 01/14/25 2110 Tue Jan 14, 2025   1259 Patient ambulated through hallways with steady gait, his mom also came along and said he looked stable on his  DTaP/Tdap/Td Vaccine (1 - Tdap) 10/13/1997 ---    IMM PNEUMOCOCCAL 19-64 (ADULT) MEDIUM RISK SERIES (1 of 1 - PPSV23) 10/13/1997 ---    IMM INFLUENZA (1) 9/1/2016 10/11/2013    PAP SMEAR 1/4/2019 1/4/2016, 2/24/2014    COLONOSCOPY 1/10/2029 1/10/2017            Current Immunizations     Influenza Vaccine Quad Inj (Preserved) 10/11/2013      Below and/or attached are the medications your provider expects you to take. Review all of your home medications and newly ordered medications with your provider and/or pharmacist. Follow medication instructions as directed by your provider and/or pharmacist. Please keep your medication list with you and share with your provider. Update the information when medications are discontinued, doses are changed, or new medications (including over-the-counter products) are added; and carry medication information at all times in the event of emergency situations     Allergies:  FOOD - (reactions not documented)     AMOXICILLIN - Vomiting     CODEINE - Rash     SEPTRA - Rash               Medications  Valid as of: February 14, 2017 - 10:36 AM    Generic Name Brand Name Tablet Size Instructions for use    Methocarbamol (Tab) ROBAXIN 750 MG Take 1 Tab by mouth 3 times a day as needed.        Norgestrel-Ethinyl Estradiol (Tab) LO-OVRAL 0.3-30 MG-MCG Take 1 Tab by mouth every day.        TraMADol HCl (Tab) ULTRAM 50 MG Take 1-2 Tabs by mouth every 6 hours as needed (pain).        .                 Medicines prescribed today were sent to:     [facility][id number] - [place], [place] - [address]    [address] [address]    Phone: [phone] Fax: [phone]    Open 24 Hours?: No      Medication refill instructions:       If your prescription bottle indicates you have medication refills left, it is not necessary to call your provider’s office. Please contact your pharmacy and they will refill your medication.    If your prescription bottle indicates you do not have any  feet.        Medications - No data to display    ED Risk Strat Scores                          SBIRT 22yo+      Flowsheet Row Most Recent Value   Initial Alcohol Screen: US AUDIT-C     1. How often do you have a drink containing alcohol? 0 Filed at: 01/14/2025 1226   2. How many drinks containing alcohol do you have on a typical day you are drinking?  0 Filed at: 01/14/2025 1226   3a. Male UNDER 65: How often do you have five or more drinks on one occasion? 0 Filed at: 01/14/2025 1226   3b. FEMALE Any Age, or MALE 65+: How often do you have 4 or more drinks on one occassion? 0 Filed at: 01/14/2025 1226   Audit-C Score 0 Filed at: 01/14/2025 1226   COLE: How many times in the past year have you...    Used an illegal drug or used a prescription medication for non-medical reasons? Never Filed at: 01/14/2025 1226                            History of Present Illness       Chief Complaint   Patient presents with    Fall     Patient reports having issues with his balance, lot his balance earlier today. Denies head strike/LOC/thinners. Only complaint is hands shaking.        Past Medical History:   Diagnosis Date    Encounter for hepatitis C screening test for low risk patient 11/07/2022    Encounter for immunization 11/07/2022    Encounter for long-term (current) use of medications 05/12/2022    Hernia, abdominal     Hydrocephaly (HCC)     Immunization due 10/01/2020    Seizures (HCC)     Spina bifida (HCC)       Past Surgical History:   Procedure Laterality Date    ABDOMINAL SURGERY      BACK SURGERY      OPEN SPINE AT BIRTH    CSF SHUNT      EYE SURGERY        Family History   Problem Relation Age of Onset    No Known Problems Mother     No Known Problems Father       Social History     Tobacco Use    Smoking status: Never     Passive exposure: Never    Smokeless tobacco: Never   Vaping Use    Vaping status: Never Used   Substance Use Topics    Alcohol use: No    Drug use: No      E-Cigarette/Vaping    E-Cigarette Use  Never User       E-Cigarette/Vaping Substances    Nicotine No     THC No     CBD No     Flavoring No     Other No     Unknown No       I have reviewed and agree with the history as documented.     54-year-old male presents for evaluation after fall.  Patient has history of cerebral palsy, seizure disorder and  shunt.  Per patient he was getting ready to go to the store with his family when he suffered a fall.  He did not strike his head or lose consciousness.  Patient states he had a minor headache prior to this however since then the headache has resolved on its own, he denies vision changes, neck or back pain.  Patient currently feels his usual self.  He denies recent headaches, other neurological symptoms.  He has been eating and drinking normally, taking his medications.  He denies other chest pain, shortness of breath, abdominal pain.  Patient's parents were brought into the room and from their perspective his mom states that patient seemed to be hunched over slightly at the waist walking when he fell forward, she caught him before he significantly hit the ground or his head.  They note a normal morning prior to this.  Per patient he felt he was walking his normal, states he does have gait and balance issues with his medical history.        Review of Systems   Constitutional:  Negative for activity change, appetite change and fever.   Eyes:  Negative for visual disturbance.   Respiratory:  Negative for shortness of breath.    Cardiovascular:  Negative for chest pain.   Gastrointestinal:  Negative for abdominal pain.   Musculoskeletal:  Negative for back pain and neck pain.   Neurological:  Negative for syncope and headaches.   All other systems reviewed and are negative.          Objective       ED Triage Vitals [01/14/25 1225]   Temperature Pulse Blood Pressure Respirations SpO2 Patient Position - Orthostatic VS   97.9 °F (36.6 °C) 73 123/69 18 98 % Lying      Temp Source Heart Rate Source BP Location FiO2  refills left, you may request refills at any time through one of the following ways: The online mAPPn system (except Urgent Care), by calling your provider’s office, or by asking your pharmacy to contact your provider’s office with a refill request. Medication refills are processed only during regular business hours and may not be available until the next business day. Your provider may request additional information or to have a follow-up visit with you prior to refilling your medication.   *Please Note: Medication refills are assigned a new Rx number when refilled electronically. Your pharmacy may indicate that no refills were authorized even though a new prescription for the same medication is available at the pharmacy. Please request the medicine by name with the pharmacy before contacting your provider for a refill.        Your To Do List     Future Labs/Procedures Complete By Expires    COMP METABOLIC PANEL  As directed 2/15/2018    LIPID PROFILE  As directed 2/15/2018         mAPPn Access Code: [email or web address]  Expires: 3/16/2017 10:36 AM    mAPPn  A secure, online tool to manage your health information     [employer]’s mAPPn® is a secure, online tool that connects you to your personalized health information from the privacy of your home -- day or night - making it very easy for you to manage your healthcare. Once the activation process is completed, you can even access your medical information using the mAPPn caitlyn, which is available for free in the Apple Caitlyn store or Google Play store.     mAPPn provides the following levels of access (as shown below):   My Chart Features   [facility]  Specialists [facility]  Urgent  Care Non-[facility]  Primary Care  Doctor   Email your healthcare team securely and privately 24/7 X X X    Manage appointments: schedule your next appointment; view details of past/upcoming appointments X      Request prescription refills. X      View recent  (%) Pain Score    Temporal Monitor Right arm -- No Pain      Vitals      Date and Time Temp Pulse SpO2 Resp BP Pain Score FACES Pain Rating User   01/14/25 1306 -- -- -- -- -- No Pain -- KO   01/14/25 1245 97.9 °F (36.6 °C) 67 98 % 18 118/70 No Pain -- KO   01/14/25 1230 -- 62 98 % 18 115/63 -- -- KO   01/14/25 1225 97.9 °F (36.6 °C) 73 98 % 18 123/69 No Pain -- SK            Physical Exam  Vitals reviewed.   Constitutional:       General: He is not in acute distress.     Appearance: Normal appearance. He is not ill-appearing, toxic-appearing or diaphoretic.   HENT:      Head: Normocephalic and atraumatic.      Right Ear: External ear normal.      Left Ear: External ear normal.      Nose: Nose normal.      Mouth/Throat:      Mouth: Mucous membranes are moist.   Eyes:      General: No scleral icterus.        Right eye: No discharge.         Left eye: No discharge.      Extraocular Movements: Extraocular movements intact.   Cardiovascular:      Rate and Rhythm: Normal rate.   Pulmonary:      Effort: Pulmonary effort is normal. No respiratory distress.   Musculoskeletal:         General: No deformity or signs of injury.      Right lower leg: No edema.      Left lower leg: No edema.   Skin:     General: Skin is warm.      Coloration: Skin is not jaundiced or pale.   Neurological:      General: No focal deficit present.      Mental Status: He is alert. Mental status is at baseline.      Gait: Gait (Wide-based gait mom states is normal, ambulatory without assistance) normal.      Comments: Uses all extremities freely         Results Reviewed       None            No orders to display       Procedures    ED Medication and Procedure Management   Prior to Admission Medications   Prescriptions Last Dose Informant Patient Reported? Taking?   acetaminophen (TYLENOL) 500 mg tablet  Father, Child Yes No   Sig: Take 500 mg by mouth every 4 (four) hours as needed   levETIRAcetam (KEPPRA) 500 mg tablet 1/14/2025  No Yes   Sig: TAKE  personal medical records, including lab and immunizations X X X X   View health record, including health history, allergies, medications X X X X   Read reports about your outpatient visits, procedures, consult and ER notes X X X X   See your discharge summary, which is a recap of your hospital and/or ER visit that includes your diagnosis, lab results, and care plan. X X       How to register for [employer]:  1. Go to  [email or web address].  2. Click on the Sign Up Now box, which takes you to the New Member Sign Up page. You will need to provide the following information:  a. Enter your Captricity Access Code exactly as it appears at the top of this page. (You will not need to use this code after you’ve completed the sign-up process. If you do not sign up before the expiration date, you must request a new code.)   b. Enter your date of birth.   c. Enter your home email address.   d. Click Submit, and follow the next screen’s instructions.  3. Create a Captricity ID. This will be your [employer]ric[employer] login ID and cannot be changed, so think of one that is secure and easy to remember.  4. Create a [employer] password. You can change your password at any time.  5. Enter your Password Reset Question and Answer. This can be used at a later time if you forget your password.   6. Enter your e-mail address. This allows you to receive e-mail notifications when new information is available in Captric[employer].  7. Click Sign Up. You can now view your health information.    For assistance activating your [employer] account, call [phone]         1 TABLET BY MOUTH TWICE DAILY   levOCARNitine (CARNITOR) 330 MG tablet 1/14/2025 Father, Child No Yes   Sig: TAKE 1 TABLET IN THE MORNING AND 2 TABLETS IN THE EVENING   loratadine (CLARITIN) 10 mg tablet  Father, Child Yes No   Sig: Take 10 mg by mouth daily      Facility-Administered Medications: None     Discharge Medication List as of 1/14/2025  1:01 PM        CONTINUE these medications which have NOT CHANGED    Details   levETIRAcetam (KEPPRA) 500 mg tablet TAKE 1 TABLET BY MOUTH TWICE DAILY, Starting Tue 12/31/2024, Normal      levOCARNitine (CARNITOR) 330 MG tablet TAKE 1 TABLET IN THE MORNING AND 2 TABLETS IN THE EVENING, Normal      acetaminophen (TYLENOL) 500 mg tablet Take 500 mg by mouth every 4 (four) hours as needed, Starting u 1/17/2019, Historical Med      loratadine (CLARITIN) 10 mg tablet Take 10 mg by mouth daily, Historical Med           No discharge procedures on file.  ED SEPSIS DOCUMENTATION   Time reflects when diagnosis was documented in both MDM as applicable and the Disposition within this note       Time User Action Codes Description Comment    1/14/2025  1:01 PM Yolanda Houston Add [W19.XXXA] Fall     1/14/2025  1:01 PM Yolanda Houston Add [Z86.69] History of cerebral palsy                  Yolanda Houston,   01/14/25 2110

## 2025-01-14 NOTE — ED NOTES
Given dc papers left with family no questions verbalized understanding      Andie Lou, RN  01/14/25 2075

## 2025-01-15 ENCOUNTER — APPOINTMENT (EMERGENCY)
Dept: CT IMAGING | Facility: HOSPITAL | Age: 55
End: 2025-01-15
Payer: MEDICARE

## 2025-01-15 ENCOUNTER — APPOINTMENT (EMERGENCY)
Dept: RADIOLOGY | Facility: HOSPITAL | Age: 55
End: 2025-01-15
Payer: MEDICARE

## 2025-01-15 ENCOUNTER — NURSE TRIAGE (OUTPATIENT)
Age: 55
End: 2025-01-15

## 2025-01-15 ENCOUNTER — HOSPITAL ENCOUNTER (EMERGENCY)
Facility: HOSPITAL | Age: 55
Discharge: HOME | End: 2025-01-15
Attending: EMERGENCY MEDICINE
Payer: MEDICARE

## 2025-01-15 VITALS
WEIGHT: 152.56 LBS | SYSTOLIC BLOOD PRESSURE: 114 MMHG | TEMPERATURE: 97.7 F | RESPIRATION RATE: 16 BRPM | OXYGEN SATURATION: 96 % | DIASTOLIC BLOOD PRESSURE: 72 MMHG | HEART RATE: 68 BPM | BODY MASS INDEX: 27.9 KG/M2

## 2025-01-15 DIAGNOSIS — R51.9 HEADACHE: Primary | ICD-10-CM

## 2025-01-15 LAB
ALBUMIN SERPL BCG-MCNC: 4.1 G/DL (ref 3.5–5)
ALP SERPL-CCNC: 57 U/L (ref 34–104)
ALT SERPL W P-5'-P-CCNC: 15 U/L (ref 7–52)
ANION GAP SERPL CALCULATED.3IONS-SCNC: 5 MMOL/L (ref 4–13)
AST SERPL W P-5'-P-CCNC: 17 U/L (ref 13–39)
BASOPHILS # BLD AUTO: 0.01 THOUSANDS/ΜL (ref 0–0.1)
BASOPHILS NFR BLD AUTO: 0 % (ref 0–1)
BILIRUB SERPL-MCNC: 0.52 MG/DL (ref 0.2–1)
BUN SERPL-MCNC: 16 MG/DL (ref 5–25)
CALCIUM SERPL-MCNC: 9.4 MG/DL (ref 8.4–10.2)
CHLORIDE SERPL-SCNC: 107 MMOL/L (ref 96–108)
CO2 SERPL-SCNC: 30 MMOL/L (ref 21–32)
CREAT SERPL-MCNC: 0.98 MG/DL (ref 0.6–1.3)
EOSINOPHIL # BLD AUTO: 0.03 THOUSAND/ΜL (ref 0–0.61)
EOSINOPHIL NFR BLD AUTO: 1 % (ref 0–6)
ERYTHROCYTE [DISTWIDTH] IN BLOOD BY AUTOMATED COUNT: 11.9 % (ref 11.6–15.1)
FLUAV AG UPPER RESP QL IA.RAPID: NEGATIVE
FLUBV AG UPPER RESP QL IA.RAPID: NEGATIVE
GFR SERPL CREATININE-BSD FRML MDRD: 88 ML/MIN/1.73SQ M
GLUCOSE SERPL-MCNC: 104 MG/DL (ref 65–140)
GLUCOSE SERPL-MCNC: 114 MG/DL (ref 65–140)
HCT VFR BLD AUTO: 41.4 % (ref 36.5–49.3)
HGB BLD-MCNC: 13.7 G/DL (ref 12–17)
IMM GRANULOCYTES # BLD AUTO: 0.01 THOUSAND/UL (ref 0–0.2)
IMM GRANULOCYTES NFR BLD AUTO: 0 % (ref 0–2)
LYMPHOCYTES # BLD AUTO: 1.16 THOUSANDS/ΜL (ref 0.6–4.47)
LYMPHOCYTES NFR BLD AUTO: 19 % (ref 14–44)
MAGNESIUM SERPL-MCNC: 2.1 MG/DL (ref 1.9–2.7)
MCH RBC QN AUTO: 30.4 PG (ref 26.8–34.3)
MCHC RBC AUTO-ENTMCNC: 33.1 G/DL (ref 31.4–37.4)
MCV RBC AUTO: 92 FL (ref 82–98)
MONOCYTES # BLD AUTO: 0.29 THOUSAND/ΜL (ref 0.17–1.22)
MONOCYTES NFR BLD AUTO: 5 % (ref 4–12)
NEUTROPHILS # BLD AUTO: 4.74 THOUSANDS/ΜL (ref 1.85–7.62)
NEUTS SEG NFR BLD AUTO: 75 % (ref 43–75)
NRBC BLD AUTO-RTO: 0 /100 WBCS
PLATELET # BLD AUTO: 185 THOUSANDS/UL (ref 149–390)
PMV BLD AUTO: 10.5 FL (ref 8.9–12.7)
POTASSIUM SERPL-SCNC: 4.3 MMOL/L (ref 3.5–5.3)
PROT SERPL-MCNC: 6.4 G/DL (ref 6.4–8.4)
RBC # BLD AUTO: 4.5 MILLION/UL (ref 3.88–5.62)
SARS-COV+SARS-COV-2 AG RESP QL IA.RAPID: NEGATIVE
SODIUM SERPL-SCNC: 142 MMOL/L (ref 135–147)
WBC # BLD AUTO: 6.24 THOUSAND/UL (ref 4.31–10.16)

## 2025-01-15 PROCEDURE — 99284 EMERGENCY DEPT VISIT MOD MDM: CPT

## 2025-01-15 PROCEDURE — 87811 SARS-COV-2 COVID19 W/OPTIC: CPT | Performed by: PHYSICIAN ASSISTANT

## 2025-01-15 PROCEDURE — 70450 CT HEAD/BRAIN W/O DYE: CPT

## 2025-01-15 PROCEDURE — 71046 X-RAY EXAM CHEST 2 VIEWS: CPT

## 2025-01-15 PROCEDURE — 87804 INFLUENZA ASSAY W/OPTIC: CPT | Performed by: PHYSICIAN ASSISTANT

## 2025-01-15 PROCEDURE — 36415 COLL VENOUS BLD VENIPUNCTURE: CPT | Performed by: PHYSICIAN ASSISTANT

## 2025-01-15 PROCEDURE — 99284 EMERGENCY DEPT VISIT MOD MDM: CPT | Performed by: PHYSICIAN ASSISTANT

## 2025-01-15 PROCEDURE — 70250 X-RAY EXAM OF SKULL: CPT

## 2025-01-15 PROCEDURE — 82948 REAGENT STRIP/BLOOD GLUCOSE: CPT

## 2025-01-15 PROCEDURE — 83735 ASSAY OF MAGNESIUM: CPT | Performed by: PHYSICIAN ASSISTANT

## 2025-01-15 PROCEDURE — 80053 COMPREHEN METABOLIC PANEL: CPT | Performed by: PHYSICIAN ASSISTANT

## 2025-01-15 PROCEDURE — 85025 COMPLETE CBC W/AUTO DIFF WBC: CPT | Performed by: PHYSICIAN ASSISTANT

## 2025-01-15 PROCEDURE — 74018 RADEX ABDOMEN 1 VIEW: CPT

## 2025-01-15 NOTE — TELEPHONE ENCOUNTER
"Patient seen in ED yesterday for fall without striking anything after losing balance. Patient did not have imaging done. Does have  shunt.     Today, patient complains of increasing headache and eye pain/pressure. Discomfort is only relieved when patient touches chin to chest.     Recommended ED due to potential need for imaging. Patient/family agreeable.     ---------------------------------------------      Reason for Disposition   Patient sounds very sick or weak to the triager    Answer Assessment - Initial Assessment Questions  1. LOCATION: \"Where does it hurt?\"       Entire head and both eyes    2. ONSET: \"When did the headache start?\" (e.g., minutes, hours, days)       Yesterday after fall (denies trauma)    3. PATTERN: \"Does the pain come and go, or has it been constant since it started?\"      Increasing since yesterday    4. SEVERITY: \"How bad is the pain?\" and \"What does it keep you from doing?\"  (e.g., Scale 1-10; mild, moderate, or severe)      Moderate      6. CAUSE: \"What do you think is causing the headache?\"      Unknown. Patient does have  shunt    7. MIGRAINE: \"Have you been diagnosed with migraine headaches?\" If Yes, ask: \"Is this headache similar?\"       Denies    8. HEAD INJURY: \"Has there been any recent injury to the head?\"       Denies    9. OTHER SYMPTOMS: \"Do you have any other symptoms?\" (e.g., fever, stiff neck, eye pain, sore throat, cold symptoms)      Eye pressure/pain. Relieved when patient touches chin to chest.    Protocols used: Headache-Adult-[place]    "

## 2025-01-15 NOTE — ED PROVIDER NOTES
Time reflects when diagnosis was documented in both MDM as applicable and the Disposition within this note       Time User Action Codes Description Comment    1/15/2025  3:23 PM Jeet Yuan Add [R51.9] Headache           ED Disposition       ED Disposition   Discharge    Condition   Stable    Date/Time   Wed Carlos 15, 2025  3:23 PM    Comment   Boris Dukes discharge to home/self care.                   Assessment & Plan       Medical Decision Making  54-year-old male here for evaluation of headache increased drowsiness right-sided ear pain see HPI for further details.    Differential diagnosis includes increased intracranial pressure, shunt malfunction, intracranial hemorrhage Ron, electrolyte disturbance, sepsis.    Shunt series and ct scan no acute findings, labs normal, stable for dc home, call [facility] neuro.     Amount and/or Complexity of Data Reviewed  Labs: ordered. Decision-making details documented in ED Course.  Radiology: ordered.        ED Course as of 01/15/25 1525   Wed Carlos 15, 2025   1310 POC Glucose: 104   1350 IMPRESSION:     1.  No acute hemorrhage, edema, or mass effect.  2.  Stable right transparietal ventricular catheter. No hydrocephalus. Compared to prior CT from 6/19/2020, slightly worsening ex vacuo dilatation of the left lateral ventricle, due to worsening encephalomalacia and gliosis along the left frontoparietal   vertex.  3.  See comments above for full analysis.     1350 IMPRESSION:     Intact  shunt catheter.            Medications - No data to display    ED Risk Strat Scores                          SBIRT 20yo+      Flowsheet Row Most Recent Value   Initial Alcohol Screen: US AUDIT-C     1. How often do you have a drink containing alcohol? 0 Filed at: 01/15/2025 1243   2. How many drinks containing alcohol do you have on a typical day you are drinking?  0 Filed at: 01/15/2025 1243   3a. Male UNDER 65: How often do you have five or more drinks on one occasion? 0 Filed at:  01/15/2025 1243   Audit-C Score 0 Filed at: 01/15/2025 1243   COLE: How many times in the past year have you...    Used an illegal drug or used a prescription medication for non-medical reasons? Never Filed at: 01/15/2025 1243                            History of Present Illness       Chief Complaint   Patient presents with    Headache     Per patients family has been complaining of a headache for the past couple of hours. Has a shunt on the right side of his neck. Family is concerned that there may be and issue with his shunt        Past Medical History:   Diagnosis Date    Encounter for hepatitis C screening test for low risk patient 11/07/2022    Encounter for immunization 11/07/2022    Encounter for long-term (current) use of medications 05/12/2022    Hernia, abdominal     Hydrocephaly (HCC)     Immunization due 10/01/2020    Seizures (HCC)     Spina bifida (HCC)       Past Surgical History:   Procedure Laterality Date    ABDOMINAL SURGERY      BACK SURGERY      OPEN SPINE AT BIRTH    CSF SHUNT      EYE SURGERY        Family History   Problem Relation Age of Onset    No Known Problems Mother     No Known Problems Father       Social History     Tobacco Use    Smoking status: Never     Passive exposure: Never    Smokeless tobacco: Never   Vaping Use    Vaping status: Never Used   Substance Use Topics    Alcohol use: No    Drug use: No      E-Cigarette/Vaping    E-Cigarette Use Never User       E-Cigarette/Vaping Substances    Nicotine No     THC No     CBD No     Flavoring No     Other No     Unknown No       I have reviewed and agree with the history as documented.     This is a 54-year-old male presenting to the emergency department today with his mother.  He has a history of cerebral palsy hydrocephaly seizures and has a  shunt.  He was seen here yesterday for evaluation after patient's mother states that he was assisted to the ground but did not have any true trauma.  He returned to baseline was ambulatory  and was subsequently discharged home appropriately.  Mother states that overnight patient has been more drowsy.  Sleeping much more than normal.  He has complained of a headache today.  After calling primary care they had recommended more advanced imaging and sent him back to the emergency department.      Headache  Associated symptoms: no abdominal pain, no back pain, no cough, no dizziness, no fatigue, no fever, no myalgias, no nausea, no neck pain, no neck stiffness, no seizures, no sore throat, no vomiting and no weakness        Review of Systems   Constitutional:  Positive for activity change. Negative for appetite change, chills, diaphoresis, fatigue, fever and unexpected weight change.   HENT: Negative.  Negative for sore throat, trouble swallowing and voice change.    Eyes: Negative.    Respiratory: Negative.  Negative for cough, chest tightness, shortness of breath and wheezing.    Cardiovascular: Negative.  Negative for chest pain, palpitations and leg swelling.   Gastrointestinal: Negative.  Negative for abdominal pain, blood in stool, nausea and vomiting.   Endocrine: Negative.    Genitourinary: Negative.  Negative for flank pain and hematuria.   Musculoskeletal: Negative.  Negative for arthralgias, back pain, gait problem, joint swelling, myalgias, neck pain and neck stiffness.   Skin: Negative.  Negative for rash and wound.   Allergic/Immunologic: Negative.    Neurological:  Positive for headaches. Negative for dizziness, seizures, syncope, weakness and light-headedness.   Hematological: Negative.    Psychiatric/Behavioral: Negative.     All other systems reviewed and are negative.          Objective       ED Triage Vitals [01/15/25 1240]   Temperature Pulse Blood Pressure Respirations SpO2 Patient Position - Orthostatic VS   97.5 °F (36.4 °C) 70 117/73 18 98 % Lying      Temp Source Heart Rate Source BP Location FiO2 (%) Pain Score    Temporal Monitor Right arm -- No Pain      Vitals      Date and  Time Temp Pulse SpO2 Resp BP Pain Score FACES Pain Rating User   01/15/25 1430 97.7 °F (36.5 °C) 81 96 % 18 116/77 No Pain -- CK   01/15/25 1400 97.6 °F (36.4 °C) 58 96 % 16 107/73 No Pain -- CK   01/15/25 1330 97.6 °F (36.4 °C) 64 98 % 18 122/75 No Pain -- CK   01/15/25 1300 97.6 °F (36.4 °C) 72 97 % 16 113/79 No Pain -- CK   01/15/25 1247 -- -- -- -- -- No Pain -- CK   01/15/25 1240 97.5 °F (36.4 °C) 70 98 % 18 117/73 No Pain -- KS            Physical Exam  Vitals reviewed.   Constitutional:       General: He is not in acute distress.     Appearance: Normal appearance. He is not ill-appearing, toxic-appearing or diaphoretic.   HENT:      Head: Normocephalic and atraumatic.      Right Ear: Tympanic membrane, ear canal and external ear normal. There is no impacted cerumen.      Left Ear: Tympanic membrane, ear canal and external ear normal. There is no impacted cerumen.      Nose: No congestion or rhinorrhea.   Eyes:      General: No scleral icterus.        Right eye: No discharge.         Left eye: No discharge.      Extraocular Movements: Extraocular movements intact.      Conjunctiva/sclera: Conjunctivae normal.   Cardiovascular:      Rate and Rhythm: Normal rate.      Pulses: Normal pulses.   Pulmonary:      Effort: Pulmonary effort is normal. No respiratory distress.      Breath sounds: No stridor. No wheezing, rhonchi or rales.   Chest:      Chest wall: No tenderness.   Musculoskeletal:         General: No swelling, tenderness, deformity or signs of injury.      Cervical back: Normal range of motion. No rigidity.      Right lower leg: No edema.      Left lower leg: No edema.   Skin:     General: Skin is warm.      Coloration: Skin is not jaundiced or pale.      Findings: No erythema, lesion or rash.   Neurological:      General: No focal deficit present.      Mental Status: He is alert and oriented to person, place, and time. Mental status is at baseline.      Cranial Nerves: No cranial nerve deficit.       Motor: No weakness.      Gait: Gait normal.   Psychiatric:         Mood and Affect: Mood normal.         Thought Content: Thought content normal.         Judgment: Judgment normal.         Results Reviewed       Procedure Component Value Units Date/Time    Comprehensive metabolic panel [[id number]] Collected: 01/15/25 1301    Lab Status: Final result Specimen: Blood from Arm, Right Updated: 01/15/25 1327     Sodium 142 mmol/L      Potassium 4.3 mmol/L      Chloride 107 mmol/L      CO2 30 mmol/L      ANION GAP 5 mmol/L      BUN 16 mg/dL      Creatinine 0.98 mg/dL      Glucose 114 mg/dL      Calcium 9.4 mg/dL      AST 17 U/L      ALT 15 U/L      Alkaline Phosphatase 57 U/L      Total Protein 6.4 g/dL      Albumin 4.1 g/dL      Total Bilirubin 0.52 mg/dL      eGFR 88 ml/min/1.73sq m     Narrative:      National Kidney Disease Foundation guidelines for Chronic Kidney Disease (CKD):     Stage 1 with normal or high GFR (GFR > 90 mL/min/1.73 square meters)    Stage 2 Mild CKD (GFR = 60-89 mL/min/1.73 square meters)    Stage 3A Moderate CKD (GFR = 45-59 mL/min/1.73 square meters)    Stage 3B Moderate CKD (GFR = 30-44 mL/min/1.73 square meters)    Stage 4 Severe CKD (GFR = 15-29 mL/min/1.73 square meters)    Stage 5 End Stage CKD (GFR <15 mL/min/1.73 square meters)  Note: GFR calculation is accurate only with a steady state creatinine    Magnesium [[id number]]  (Normal) Collected: 01/15/25 1301    Lab Status: Final result Specimen: Blood from Arm, Right Updated: 01/15/25 1327     Magnesium 2.1 mg/dL     FLU/COVID Rapid Antigen (30 min. TAT) - Preferred screening test in ED [[id number]]  (Normal) Collected: 01/15/25 1301    Lab Status: Final result Specimen: Nares from Nose Updated: 01/15/25 1326     SARS COV Rapid Antigen Negative     Influenza A Rapid Antigen Negative     Influenza B Rapid Antigen Negative    Narrative:      This test has been performed using the Quidel Karlee 2 FLU+SARS Antigen test under the Emergency Use  Authorization (EUA). This test has been validated by the  and verified by the performing laboratory. The Karlee uses lateral flow immunofluorescent sandwich assay to detect SARS-COV, Influenza A and Influenza B Antigen.     The Intenseidel Karlee 2 SARS Antigen test does not differentiate between SARS-CoV and SARS-CoV-2.     Negative results are presumptive and may be confirmed with a molecular assay, if necessary, for patient management. Negative results do not rule out SARS-CoV-2 or influenza infection and should not be used as the sole basis for treatment or patient management decisions. A negative test result may occur if the level of antigen in a sample is below the limit of detection of this test.     Positive results are indicative of the presence of viral antigens, but do not rule out bacterial infection or co-infection with other viruses.     All test results should be used as an adjunct to clinical observations and other information available to the provider.    FOR PEDIATRIC PATIENTS - copy/paste COVID Guidelines URL to browser: [email or web address]    CBC and differential [[id number]] Collected: 01/15/25 1301    Lab Status: Final result Specimen: Blood from Arm, Right Updated: 01/15/25 1310     WBC 6.24 Thousand/uL      RBC 4.50 Million/uL      Hemoglobin 13.7 g/dL      Hematocrit 41.4 %      MCV 92 fL      MCH 30.4 pg      MCHC 33.1 g/dL      RDW 11.9 %      MPV 10.5 fL      Platelets 185 Thousands/uL      nRBC 0 /100 WBCs      Segmented % 75 %      Immature Grans % 0 %      Lymphocytes % 19 %      Monocytes % 5 %      Eosinophils Relative 1 %      Basophils Relative 0 %      Absolute Neutrophils 4.74 Thousands/µL      Absolute Immature Grans 0.01 Thousand/uL      Absolute Lymphocytes 1.16 Thousands/µL      Absolute Monocytes 0.29 Thousand/µL      Eosinophils Absolute 0.03 Thousand/µL      Basophils Absolute 0.01 Thousands/µL     Fingerstick  Glucose (POCT) [[id number]]  (Normal) Collected: 01/15/25 1240    Lab Status: Final result Specimen: Blood Updated: 01/15/25 1241     POC Glucose 104 mg/dl             XR shunt series   Final Interpretation by Raymond Guzman MD (01/15 1335)      Intact  shunt catheter.                  Workstation performed: [id number]         CT head without contrast   Final Interpretation by Rock Farris MD (01/15 1326)      1.  No acute hemorrhage, edema, or mass effect.   2.  Stable right transparietal ventricular catheter. No hydrocephalus. Compared to prior CT from 6/19/2020, slightly worsening ex vacuo dilatation of the left lateral ventricle, due to worsening encephalomalacia and gliosis along the left frontoparietal    vertex.   3.  See comments above for full analysis.                  Workstation performed: [id number]             Procedures    ED Medication and Procedure Management   Prior to Admission Medications   Prescriptions Last Dose Informant Patient Reported? Taking?   acetaminophen (TYLENOL) 500 mg tablet  Father, Child Yes No   Sig: Take 500 mg by mouth every 4 (four) hours as needed   levETIRAcetam (KEPPRA) 500 mg tablet   No No   Sig: TAKE 1 TABLET BY MOUTH TWICE DAILY   levOCARNitine (CARNITOR) 330 MG tablet  Father, Child No No   Sig: TAKE 1 TABLET IN THE MORNING AND 2 TABLETS IN THE EVENING   loratadine (CLARITIN) 10 mg tablet  Father, Child Yes No   Sig: Take 10 mg by mouth daily      Facility-Administered Medications: None     Patient's Medications   Discharge Prescriptions    No medications on file     No discharge procedures on file.  ED SEPSIS DOCUMENTATION   Time reflects when diagnosis was documented in both MDM as applicable and the Disposition within this note       Time User Action Codes Description Comment    1/15/2025  3:23 PM Jeet Yuan Add [R51.9] Headache                  Jeet Yuan PA-C  01/15/25 8474

## 2025-01-21 ENCOUNTER — TELEPHONE (OUTPATIENT)
Dept: FAMILY MEDICINE CLINIC | Facility: CLINIC | Age: 55
End: 2025-01-21

## 2025-01-21 NOTE — TELEPHONE ENCOUNTER
Received a fax from [employer] stating the patients Levocarnitin is no longer on their formulary. They state you will need to change the medication.

## 2025-02-04 ENCOUNTER — TELEPHONE (OUTPATIENT)
Dept: FAMILY MEDICINE CLINIC | Facility: CLINIC | Age: 55
End: 2025-02-04

## 2025-02-04 NOTE — TELEPHONE ENCOUNTER
PA for levOCARNitine (CARNITOR) 330 MG tablet SUBMITTED to     via    []CMM-KEY:   [x]Surescripts-Case ID # [id number]   []Availity-Auth ID # NDC #   []Faxed to plan   []Other website   []Phone call Case ID #     [x]PA sent as URGENT    All office notes, labs and other pertaining documents and studies sent. Clinical questions answered. Awaiting determination from insurance company.     Turnaround time for your insurance to make a decision on your Prior Authorization can take 7-21 business days.

## 2025-02-05 NOTE — TELEPHONE ENCOUNTER
PA for levOCARNitine (CARNITOR) 330 MG tablet  APPROVED     Date(s) approved January 1, 2025 to February 4, 2026     Case #[id number]       Patient advised by          []MyChart Message  [x]Phone call, spoke with mother, obtained verbal consent from patient to speak with mother.   []LMOM  []L/M to call office as no active Communication consent on file  []Unable to leave detailed message as VM not approved on Communication consent       Pharmacy advised by    [x]Fax  []Phone call    Approval letter scanned into Media Yes

## 2025-05-06 DIAGNOSIS — G40.909 SEIZURE DISORDER (HCC): ICD-10-CM

## 2025-05-06 RX ORDER — LEVETIRACETAM 500 MG/1
500 TABLET ORAL 2 TIMES DAILY
Qty: 60 TABLET | Refills: 0 | Status: SHIPPED | OUTPATIENT
Start: 2025-05-06 | End: 2025-05-16 | Stop reason: DRUGHIGH

## 2025-05-14 ENCOUNTER — APPOINTMENT (OUTPATIENT)
Dept: LAB | Facility: CLINIC | Age: 55
End: 2025-05-14
Payer: MEDICARE

## 2025-05-14 DIAGNOSIS — E78.5 DYSLIPIDEMIA: ICD-10-CM

## 2025-05-14 DIAGNOSIS — Z79.899 ENCOUNTER FOR LONG-TERM (CURRENT) USE OF MEDICATIONS: ICD-10-CM

## 2025-05-14 DIAGNOSIS — G40.909 SEIZURE DISORDER (HCC): ICD-10-CM

## 2025-05-14 LAB
ALBUMIN SERPL BCG-MCNC: 4.2 G/DL (ref 3.5–5)
ALP SERPL-CCNC: 64 U/L (ref 34–104)
ALT SERPL W P-5'-P-CCNC: 17 U/L (ref 7–52)
ANION GAP SERPL CALCULATED.3IONS-SCNC: 8 MMOL/L (ref 4–13)
AST SERPL W P-5'-P-CCNC: 20 U/L (ref 13–39)
BASOPHILS # BLD AUTO: 0.02 THOUSANDS/ÂΜL (ref 0–0.1)
BASOPHILS NFR BLD AUTO: 0 % (ref 0–1)
BILIRUB SERPL-MCNC: 0.61 MG/DL (ref 0.2–1)
BUN SERPL-MCNC: 16 MG/DL (ref 5–25)
CALCIUM SERPL-MCNC: 9 MG/DL (ref 8.4–10.2)
CHLORIDE SERPL-SCNC: 107 MMOL/L (ref 96–108)
CHOLEST SERPL-MCNC: 154 MG/DL (ref ?–200)
CO2 SERPL-SCNC: 30 MMOL/L (ref 21–32)
CREAT SERPL-MCNC: 0.97 MG/DL (ref 0.6–1.3)
EOSINOPHIL # BLD AUTO: 0.07 THOUSAND/ÂΜL (ref 0–0.61)
EOSINOPHIL NFR BLD AUTO: 1 % (ref 0–6)
ERYTHROCYTE [DISTWIDTH] IN BLOOD BY AUTOMATED COUNT: 12.1 % (ref 11.6–15.1)
GFR SERPL CREATININE-BSD FRML MDRD: 89 ML/MIN/1.73SQ M
GLUCOSE P FAST SERPL-MCNC: 84 MG/DL (ref 65–99)
HCT VFR BLD AUTO: 42.6 % (ref 36.5–49.3)
HDLC SERPL-MCNC: 46 MG/DL
HGB BLD-MCNC: 13.9 G/DL (ref 12–17)
IMM GRANULOCYTES # BLD AUTO: 0.01 THOUSAND/UL (ref 0–0.2)
IMM GRANULOCYTES NFR BLD AUTO: 0 % (ref 0–2)
LDLC SERPL CALC-MCNC: 88 MG/DL (ref 0–100)
LEVETIRACETAM SERPL-MCNC: 10.1 UG/ML (ref 12–46)
LYMPHOCYTES # BLD AUTO: 1.9 THOUSANDS/ÂΜL (ref 0.6–4.47)
LYMPHOCYTES NFR BLD AUTO: 31 % (ref 14–44)
MCH RBC QN AUTO: 30.7 PG (ref 26.8–34.3)
MCHC RBC AUTO-ENTMCNC: 32.6 G/DL (ref 31.4–37.4)
MCV RBC AUTO: 94 FL (ref 82–98)
MONOCYTES # BLD AUTO: 0.49 THOUSAND/ÂΜL (ref 0.17–1.22)
MONOCYTES NFR BLD AUTO: 8 % (ref 4–12)
NEUTROPHILS # BLD AUTO: 3.74 THOUSANDS/ÂΜL (ref 1.85–7.62)
NEUTS SEG NFR BLD AUTO: 60 % (ref 43–75)
NRBC BLD AUTO-RTO: 0 /100 WBCS
PLATELET # BLD AUTO: 197 THOUSANDS/UL (ref 149–390)
PMV BLD AUTO: 11 FL (ref 8.9–12.7)
POTASSIUM SERPL-SCNC: 3.9 MMOL/L (ref 3.5–5.3)
PROT SERPL-MCNC: 6.5 G/DL (ref 6.4–8.4)
RBC # BLD AUTO: 4.53 MILLION/UL (ref 3.88–5.62)
SODIUM SERPL-SCNC: 145 MMOL/L (ref 135–147)
TRIGL SERPL-MCNC: 98 MG/DL (ref ?–150)
WBC # BLD AUTO: 6.23 THOUSAND/UL (ref 4.31–10.16)

## 2025-05-14 PROCEDURE — 36415 COLL VENOUS BLD VENIPUNCTURE: CPT

## 2025-05-14 PROCEDURE — 80053 COMPREHEN METABOLIC PANEL: CPT

## 2025-05-14 PROCEDURE — 85025 COMPLETE CBC W/AUTO DIFF WBC: CPT

## 2025-05-14 PROCEDURE — 80061 LIPID PANEL: CPT

## 2025-05-14 PROCEDURE — 83520 IMMUNOASSAY QUANT NOS NONAB: CPT

## 2025-05-16 ENCOUNTER — OFFICE VISIT (OUTPATIENT)
Dept: FAMILY MEDICINE CLINIC | Facility: CLINIC | Age: 55
End: 2025-05-16
Payer: MEDICARE

## 2025-05-16 VITALS
HEIGHT: 62 IN | SYSTOLIC BLOOD PRESSURE: 118 MMHG | HEART RATE: 61 BPM | OXYGEN SATURATION: 98 % | TEMPERATURE: 97 F | WEIGHT: 152.8 LBS | BODY MASS INDEX: 28.12 KG/M2 | DIASTOLIC BLOOD PRESSURE: 69 MMHG

## 2025-05-16 DIAGNOSIS — G91.1 OBSTRUCTIVE HYDROCEPHALUS (HCC): ICD-10-CM

## 2025-05-16 DIAGNOSIS — Z12.5 SCREENING FOR PROSTATE CANCER: ICD-10-CM

## 2025-05-16 DIAGNOSIS — Z79.899 ENCOUNTER FOR LONG-TERM (CURRENT) USE OF MEDICATIONS: ICD-10-CM

## 2025-05-16 DIAGNOSIS — G40.909 SEIZURE DISORDER (HCC): Primary | ICD-10-CM

## 2025-05-16 DIAGNOSIS — E71.40 CARNITINE DEFICIENCY (HCC): ICD-10-CM

## 2025-05-16 DIAGNOSIS — Q03.0 HYDROCEPHALUS ASSOCIATED WITH CONGENITAL AQUEDUCT STENOSIS (HCC): ICD-10-CM

## 2025-05-16 PROCEDURE — 99214 OFFICE O/P EST MOD 30 MIN: CPT | Performed by: FAMILY MEDICINE

## 2025-05-16 PROCEDURE — G2211 COMPLEX E/M VISIT ADD ON: HCPCS | Performed by: FAMILY MEDICINE

## 2025-05-16 RX ORDER — LEVETIRACETAM 750 MG/1
750 TABLET ORAL 2 TIMES DAILY
Qty: 180 TABLET | Refills: 1 | Status: SHIPPED | OUTPATIENT
Start: 2025-05-16

## 2025-05-16 NOTE — ASSESSMENT & PLAN NOTE
I was able to review his neurosurgeon's last office visit.  I also reviewed the patient's CT scan reports.  Patient is currently asymptomatic and doing well.

## 2025-05-16 NOTE — ASSESSMENT & PLAN NOTE
I reviewed the patient's labs.  His Keppra level was low at 10.1.  I increased his Keppra to 750 mg twice daily.  I am going to have him get a repeat Keppra level in 1 month.  I reviewed the patient's other labs.  His CBC, CMP were unremarkable.  Lipid panel was also normal.  Orders:    levETIRAcetam (Keppra) 750 mg tablet; Take 1 tablet (750 mg total) by mouth 2 (two) times a day    Levetiracetam level; Future

## 2025-05-16 NOTE — PROGRESS NOTES
Name: Boris Dukes      : 1970      MRN: [id number]  Encounter Provider: Raymond Darden DO  Encounter Date: 2025   Encounter department: [facility]  :  Assessment & Plan  Seizure disorder (HCC)  I reviewed the patient's labs.  His Keppra level was low at 10.1.  I increased his Keppra to 750 mg twice daily.  I am going to have him get a repeat Keppra level in 1 month.  I reviewed the patient's other labs.  His CBC, CMP were unremarkable.  Lipid panel was also normal.  Orders:    levETIRAcetam (Keppra) 750 mg tablet; Take 1 tablet (750 mg total) by mouth 2 (two) times a day    Levetiracetam level; Future    Encounter for long-term (current) use of medications    Orders:    Levetiracetam level; Future    Obstructive hydrocephalus (HCC)         Screening for prostate cancer    Orders:    PSA, Total Screen; Future    Carnitine deficiency (HCC)  Patient will continue levocarnitine 330 mg twice daily.         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  I also reviewed the patient's CT scan reports.  Patient is currently asymptomatic and doing well.                History of Present Illness   This is a 54-year-old male who presents to the office today accompanied by his father.  The patient had a shunt revision in January.  He had an episode where he was coming down the steps.  His mother told him to pick his head up and he said it was.  He then had a syncopal episode.  When he awakened, his family asked him to stand but he felt he was standing.  He was quite confused and this led to the shunt revision.  The patient is doing well otherwise.  He did have blood work done.  His father states he has not really been very active.      Review of Systems   Constitutional:  Negative for fatigue.   Cardiovascular:  Negative for chest pain, palpitations and leg swelling.   Gastrointestinal:  Negative for abdominal  "distention, abdominal pain, blood in stool, constipation, diarrhea and nausea.   Neurological:  Negative for dizziness, seizures, syncope, light-headedness and headaches.       Objective   /69   Pulse 61   Temp (!) 97 °F (36.1 °C) (Tympanic)   Ht 5' 2\" (1.575 m)   Wt 69.3 kg (152 lb 12.8 oz)   SpO2 98%   BMI 27.95 kg/m²      Physical Exam  Vitals reviewed.   Constitutional:       Comments: This is a 54-year-old white male who appears his stated age.  The patient is nonseptic in appearance and in no apparent distress.   HENT:      Head: Normocephalic and atraumatic.      Right Ear: Tympanic membrane, ear canal and external ear normal. There is no impacted cerumen.      Left Ear: Tympanic membrane, ear canal and external ear normal. There is no impacted cerumen.      Mouth/Throat:      Mouth: Mucous membranes are moist.      Pharynx: Oropharynx is clear. No oropharyngeal exudate or posterior oropharyngeal erythema.     Eyes:      General:         Right eye: No discharge.         Left eye: No discharge.      Conjunctiva/sclera: Conjunctivae normal.      Pupils: Pupils are equal, round, and reactive to light.       Cardiovascular:      Rate and Rhythm: Normal rate and regular rhythm.      Heart sounds: Normal heart sounds. No murmur heard.     No friction rub. No gallop.   Pulmonary:      Effort: Pulmonary effort is normal. No respiratory distress.      Breath sounds: Normal breath sounds. No stridor. No wheezing, rhonchi or rales.   Abdominal:      General: There is no distension.      Palpations: Abdomen is soft. There is no mass.      Tenderness: There is no abdominal tenderness. There is no guarding.     Musculoskeletal:      Cervical back: Neck supple.   Lymphadenopathy:      Cervical: No cervical adenopathy.     Skin:     Comments: Incision site on the right hand looks clean and dry.  There is no swelling.  There is no pus or drainage.     Psychiatric:         Mood and Affect: Mood normal.         " Behavior: Behavior normal.         Thought Content: Thought content normal.         Judgment: Judgment normal.     Extremities: Without cyanosis, clubbing, or edema

## 2025-06-18 ENCOUNTER — APPOINTMENT (OUTPATIENT)
Dept: LAB | Facility: CLINIC | Age: 55
End: 2025-06-18
Payer: MEDICARE

## 2025-06-18 DIAGNOSIS — Z12.5 SCREENING FOR PROSTATE CANCER: ICD-10-CM

## 2025-06-18 DIAGNOSIS — Z79.899 ENCOUNTER FOR LONG-TERM (CURRENT) USE OF MEDICATIONS: ICD-10-CM

## 2025-06-18 DIAGNOSIS — G40.909 SEIZURE DISORDER (HCC): ICD-10-CM

## 2025-06-18 LAB
LEVETIRACETAM SERPL-MCNC: 36.6 UG/ML (ref 12–46)
PSA SERPL-MCNC: 1.53 NG/ML (ref 0–4)

## 2025-06-18 PROCEDURE — G0103 PSA SCREENING: HCPCS

## 2025-06-18 PROCEDURE — 36415 COLL VENOUS BLD VENIPUNCTURE: CPT

## 2025-06-18 PROCEDURE — 83520 IMMUNOASSAY QUANT NOS NONAB: CPT

## 2025-06-19 ENCOUNTER — RESULTS FOLLOW-UP (OUTPATIENT)
Dept: FAMILY MEDICINE CLINIC | Facility: CLINIC | Age: 55
End: 2025-06-19